# Patient Record
Sex: MALE | Race: WHITE | NOT HISPANIC OR LATINO | URBAN - METROPOLITAN AREA
[De-identification: names, ages, dates, MRNs, and addresses within clinical notes are randomized per-mention and may not be internally consistent; named-entity substitution may affect disease eponyms.]

---

## 2019-12-17 ENCOUNTER — EMERGENCY (EMERGENCY)
Facility: HOSPITAL | Age: 34
LOS: 1 days | Discharge: HOME | End: 2019-12-17
Attending: EMERGENCY MEDICINE | Admitting: INTERNAL MEDICINE
Payer: SELF-PAY

## 2019-12-17 VITALS
WEIGHT: 158.73 LBS | TEMPERATURE: 98 F | RESPIRATION RATE: 16 BRPM | OXYGEN SATURATION: 98 % | DIASTOLIC BLOOD PRESSURE: 84 MMHG | SYSTOLIC BLOOD PRESSURE: 136 MMHG | HEART RATE: 66 BPM | HEIGHT: 72 IN

## 2019-12-17 DIAGNOSIS — K64.4 RESIDUAL HEMORRHOIDAL SKIN TAGS: Chronic | ICD-10-CM

## 2019-12-17 PROCEDURE — 99284 EMERGENCY DEPT VISIT MOD MDM: CPT

## 2019-12-17 PROCEDURE — 99283 EMERGENCY DEPT VISIT LOW MDM: CPT

## 2019-12-17 RX ORDER — LIDOCAINE/HYDROCORTISONE AC 3 %-0.5 %
1 CREAM WITH APPLICATOR RECTAL
Qty: 1 | Refills: 0
Start: 2019-12-17 | End: 2019-12-26

## 2019-12-17 RX ORDER — IBUPROFEN 200 MG
1 TABLET ORAL
Qty: 12 | Refills: 0
Start: 2019-12-17 | End: 2019-12-19

## 2019-12-17 RX ORDER — DOCUSATE SODIUM 100 MG
1 CAPSULE ORAL
Qty: 14 | Refills: 0
Start: 2019-12-17 | End: 2019-12-23

## 2019-12-17 NOTE — ED PROVIDER NOTE - OBJECTIVE STATEMENT
34 yr old male with hx of hemorrhoid sx ( 8 years ago) presents with rectal pain x 1 week, and blood noted with BM x 3 days. Pt denies any fever, chills, abdominal pain, n/v/d, or any other symptoms.

## 2019-12-17 NOTE — ED PROVIDER NOTE - ATTENDING CONTRIBUTION TO CARE
pt c/o anal pain for about 1 week with small amt blood with BMs last 3 days. no abd pain. no fever. no dizziness, sob. has been straining c BMs.    exam:   General: well appearing, NAD.   cor: RRR, s1s2, 2+rad pulses.   lungs: ctabl, no resp distress.   abd: soft, ntnd. anus: L side external hemorrhoid at 3 oclock, 10x6mm, not thrombosed, +tender. no bleeding  neuro: a&ox3, cn2-12 intact, MARTINEZ, 5/5 strength c nl sensation all extremities, nl coordination.   MSK: no extremity swelling.  Skin: normal, no rash    AP: anal pain/bleeding. c/w external hemorrhoid.  treat c lidocaine/HC topical, motrin, stool softeners. GI/surg fu

## 2019-12-17 NOTE — ED ADULT NURSE NOTE - OBJECTIVE STATEMENT
Pt presents to the ER complaining of having rectal pain for about two weeks. Pt stated that he had a fistula in the past and that it was removed. Pt pain level 10/10. Pt denies fever, vomiting and diarrhea.

## 2019-12-17 NOTE — ED PROVIDER NOTE - CLINICAL SUMMARY MEDICAL DECISION MAKING FREE TEXT BOX
34 yr old male with hx of hemorrhoid sx ( 8 years ago) presents with rectal pain x 1 week, and blood noted with BM x 3 days. Pt denies any fever, chills, abdominal pain, n/v/d, or any other symptoms.   external hemorrhoid noted, no signs of thrombosed, pt well appearing   will discharge with colace, motrin, and lido/ hydrocortisone, stable for dc and follow up with GI or surgery

## 2019-12-17 NOTE — ED PROVIDER NOTE - CARE PROVIDERS DIRECT ADDRESSES
[email or web address]

## 2019-12-17 NOTE — ED PROVIDER NOTE - PATIENT PORTAL LINK FT
You can access the FollowMyHealth Patient Portal offered by [facility] by registering at the following website: [email or web address]. By joining [facility]’s FollowMyHealth portal, you will also be able to view your health information using other applications (apps) compatible with our system.

## 2019-12-17 NOTE — ED PROVIDER NOTE - NSFOLLOWUPINSTRUCTIONS_ED_ALL_ED_FT
Drink plenty of fluids   High fiber diet   Take motrin 600mg every 6 hours as needed for pain.   Take colace prescribed and use ointment as prescribed   Follow up with surgery of GI doctor.     Hemorrhoids    WHAT YOU NEED TO KNOW:    Hemorrhoids are swollen blood vessels inside your rectum (internal hemorrhoids) or on your anus (external hemorrhoids). Sometimes a hemorrhoid may prolapse. This means it extends out of your anus.    DISCHARGE INSTRUCTIONS:    Return to the emergency department if:     You have severe pain in your rectum or around your anus.      You have severe pain in your abdomen and you are vomiting.       You have bleeding from your anus that soaks through your underwear.     Contact your healthcare provider if:     You have frequent and painful bowel movements.      Your hemorrhoid looks or feels more swollen than usual.       You do not have a bowel movement for 2 days or more.       You see or feel tissue coming through your anus.       You have questions or concerns about your condition or care.    Medicines: You may need any of the following:     A pad, cream, or ointment can help decrease pain, swelling, and itching.       Stool softeners help treat or prevent constipation.       NSAIDs, such as ibuprofen, help decrease swelling, pain, and fever. NSAIDs can cause stomach bleeding or kidney problems in certain people. If you take blood thinner medicine, always ask your healthcare provider if NSAIDs are safe for you. Always read the medicine label and follow directions.      Take your medicine as directed. Contact your healthcare provider if you think your medicine is not helping or if you have side effects. Tell him or her if you are allergic to any medicine. Keep a list of the medicines, vitamins, and herbs you take. Include the amounts, and when and why you take them. Bring the list or the pill bottles to follow-up visits. Carry your medicine list with you in case of an emergency.    Manage your symptoms:     Apply ice on your anus for 15 to 20 minutes every hour or as directed. Use an ice pack, or put crushed ice in a plastic bag. Cover it with a towel before you apply it to your anus. Ice helps prevent tissue damage and decreases swelling and pain.      Take a sitz bath. Fill a bathtub with 4 to 6 inches of warm water. You may also use a sitz bath pan that fits inside a toilet bowl. Sit in the sitz bath for 15 minutes. Do this 3 times a day, and after each bowel movement. The warm water can help decrease pain and swelling.       Keep your anal area clean. Gently wash the area with warm water daily. Soap may irritate the area. After a bowel movement, wipe with moist towelettes or wet toilet paper. Dry toilet paper can irritate the area.     Prevent hemorrhoids:     Do not strain to have a bowel movement. Do not sit on the toilet too long. These actions can increase pressure on the tissues in your rectum and anus.       Drink plenty of liquids. Liquids can help prevent constipation. Ask how much liquid to drink each day and which liquids are best for you.       Eat a variety of high-fiber foods. Examples include fruits, vegetables, and whole grains. Ask your healthcare provider how much fiber you need each day. You may need to take a fiber supplement.            Exercise as directed. Exercise, such as walking, may make it easier to have a bowel movement. Ask your healthcare provider to help you create an exercise plan.       Do not have anal sex. Anal sex can weaken the skin around your rectum and anus.       Avoid heavy lifting. This can cause straining and increase your risk for another hemorrhoid.     Follow up with your healthcare provider as directed: Write down your questions so you remember to ask them during your visits.

## 2019-12-17 NOTE — ED PROVIDER NOTE - CARE PROVIDER_API CALL
Mansoor Nieves)  ColonRectal Surgery; Surgery  [address], [address]  [place], [place] [id number]  Phone: [phone]  Fax: [phone]  Follow Up Time:     Patrick Villatoro)  Gastroenterology; Internal Medicine  [address] [place] [address]  Phone: [phone]  Fax: [phone]  Follow Up Time:

## 2019-12-20 ENCOUNTER — APPOINTMENT (OUTPATIENT)
Dept: FAMILY MEDICINE | Facility: HOSPITAL | Age: 34
End: 2019-12-20

## 2019-12-20 ENCOUNTER — OUTPATIENT (OUTPATIENT)
Dept: OUTPATIENT SERVICES | Facility: HOSPITAL | Age: 34
LOS: 1 days | End: 2019-12-20
Payer: SELF-PAY

## 2019-12-20 VITALS
SYSTOLIC BLOOD PRESSURE: 123 MMHG | DIASTOLIC BLOOD PRESSURE: 83 MMHG | RESPIRATION RATE: 14 BRPM | WEIGHT: 185 LBS | HEIGHT: 72.75 IN | TEMPERATURE: 98 F | OXYGEN SATURATION: 97 % | HEART RATE: 71 BPM | BODY MASS INDEX: 24.52 KG/M2

## 2019-12-20 DIAGNOSIS — Z80.6 FAMILY HISTORY OF LEUKEMIA: ICD-10-CM

## 2019-12-20 DIAGNOSIS — K64.4 RESIDUAL HEMORRHOIDAL SKIN TAGS: Chronic | ICD-10-CM

## 2019-12-20 DIAGNOSIS — Z00.00 ENCOUNTER FOR GENERAL ADULT MEDICAL EXAMINATION WITHOUT ABNORMAL FINDINGS: ICD-10-CM

## 2019-12-20 PROCEDURE — G0463: CPT

## 2019-12-20 NOTE — PHYSICAL EXAM
[No Acute Distress] : no acute distress [Well Developed] : well developed [Well Nourished] : well nourished [No Lymphadenopathy] : no lymphadenopathy [Supple] : supple [Thyroid Normal, No Nodules] : the thyroid was normal and there were no nodules present [No Respiratory Distress] : no respiratory distress  [No Accessory Muscle Use] : no accessory muscle use [Clear to Auscultation] : lungs were clear to auscultation bilaterally [Normal Rate] : normal rate  [Regular Rhythm] : with a regular rhythm [Normal S1, S2] : normal S1 and S2 [No Murmur] : no murmur heard [No Extremity Clubbing/Cyanosis] : no extremity clubbing/cyanosis [No Edema] : there was no peripheral edema [Soft] : abdomen soft [Non-distended] : non-distended [No Masses] : no abdominal mass palpated [Normal Supraclavicular Nodes] : no supraclavicular lymphadenopathy [Normal Affect] : the affect was normal [Normal Anterior Cervical Nodes] : no anterior cervical lymphadenopathy [Normal Insight/Judgement] : insight and judgment were intact [Normal Sphincter Tone] : normal sphincter tone [de-identified] : Uncomfortable appearing [de-identified] : Mild lwoer abdominal discomfort w/ no guarding/rigidity/rebound [FreeTextEntry1] : L side external hemorrhoid, mildly tender on palpation, no erythema/edema/fluctuance appreciated. + internal hemorrhoids.

## 2019-12-20 NOTE — HISTORY OF PRESENT ILLNESS
[FreeTextEntry1] : New pt - CPE [de-identified] : 34M comes in to establish care and for ED follow up. Pt went 12/17/19 Tuesday to ED for severe anal pain for which he was told he has fistula and external hemorrhoid for which he was prescribed ibuprofen. Reports anal pain started ~1 week ago, has been getting worse since then. He had food poisoning a few weeks ago and was having diarrhea for a few weeks afterwards, which he thinks started this episode. He has been having 3 days of blood in stool, small amount, seen in toilet bowl. Pain is located both inside and outside the anal area. He is afraid to have BM today bc of severe pain. + abdominal discomfort. [place] for which he had "small surgery". Denies fever, chills, nausea/vomiting, abdominal pain, weakness, fatigue, SOB, etc.\par \par Pt is from [facility].

## 2019-12-20 NOTE — ASSESSMENT
[FreeTextEntry1] : 34M w/ PMH as above comes in to establish care.\par \par 1. Health maintenance\par Will address blood work and flu vaccine at next visit when patient not in so much pain\par \par 2. Anal pain/external hemorrhoid\par No evidence of thrombosed external hemorrhoid at this time.\par Continue colace/high fiber diet\par Sent preparation H cream to pharmacy & recommended he use preparation H wipes as well\par Sent 2 days of tramadol-tylenol tabs BID PRN severe pain, may continue ibuprofen as well\par Sitz baths\par Surgery referral provided in case he needs hemorrhoidectomy in future\par Advised pt if he develops fever/chills, worsening/severe anal/abdominal pain, N/V to proceed to ED.\par \par RTC in 2 weeks for f/u

## 2019-12-22 DIAGNOSIS — K62.89 OTHER SPECIFIED DISEASES OF ANUS AND RECTUM: ICD-10-CM

## 2019-12-24 DIAGNOSIS — K62.89 OTHER SPECIFIED DISEASES OF ANUS AND RECTUM: ICD-10-CM

## 2019-12-24 DIAGNOSIS — K64.4 RESIDUAL HEMORRHOIDAL SKIN TAGS: ICD-10-CM

## 2020-01-04 ENCOUNTER — TRANSCRIPTION ENCOUNTER (OUTPATIENT)
Age: 35
End: 2020-01-04

## 2020-01-04 ENCOUNTER — MED ADMIN CHARGE (OUTPATIENT)
Age: 35
End: 2020-01-04

## 2020-01-04 ENCOUNTER — APPOINTMENT (OUTPATIENT)
Dept: FAMILY MEDICINE | Facility: HOSPITAL | Age: 35
End: 2020-01-04

## 2020-01-04 ENCOUNTER — OUTPATIENT (OUTPATIENT)
Dept: OUTPATIENT SERVICES | Facility: HOSPITAL | Age: 35
LOS: 1 days | End: 2020-01-04
Payer: SELF-PAY

## 2020-01-04 VITALS
HEART RATE: 88 BPM | TEMPERATURE: 99.5 F | RESPIRATION RATE: 16 BRPM | OXYGEN SATURATION: 97 % | DIASTOLIC BLOOD PRESSURE: 85 MMHG | WEIGHT: 187 LBS | SYSTOLIC BLOOD PRESSURE: 138 MMHG | BODY MASS INDEX: 24.84 KG/M2

## 2020-01-04 DIAGNOSIS — Z00.00 ENCOUNTER FOR GENERAL ADULT MEDICAL EXAMINATION WITHOUT ABNORMAL FINDINGS: ICD-10-CM

## 2020-01-04 DIAGNOSIS — K64.4 RESIDUAL HEMORRHOIDAL SKIN TAGS: Chronic | ICD-10-CM

## 2020-01-04 PROBLEM — Z78.9 OTHER SPECIFIED HEALTH STATUS: Chronic | Status: ACTIVE | Noted: 2019-12-17

## 2020-01-04 PROCEDURE — G0463: CPT

## 2020-01-04 NOTE — PHYSICAL EXAM
[Normal Oropharynx] : the oropharynx was normal [No Lymphadenopathy] : no lymphadenopathy [Supple] : supple [Normal] : no posterior cervical lymphadenopathy and no anterior cervical lymphadenopathy

## 2020-01-05 NOTE — REVIEW OF SYSTEMS
[Fatigue] : fatigue [Nasal Discharge] : nasal discharge [Sore Throat] : sore throat [Cough] : cough [Chills] : no chills [Shortness Of Breath] : no shortness of breath [Fever] : no fever [Constipation] : no constipation [Diarrhea] : no diarrhea [Abdominal Pain] : no abdominal pain

## 2020-01-05 NOTE — ASSESSMENT
[FreeTextEntry1] : 34M presents for f/u external hemorrhoid, also with viral URI\par -Anal pain resolved\par -Constipation resolved\par -Encouraged patient to continue high fiber and water intake\par -Centor = 0. Provided reassurance that URI is likely viral\par -Flu vaccine administered\par -RTC for health maintenance

## 2020-01-05 NOTE — HISTORY OF PRESENT ILLNESS
[de-identified] : 34M presents for f/u external hemorrhoid. Also with URI\par -Pain and discomfort have resolved\par -No longer requiring preparation H wipes\par -Increased fiber intake. Has stopped coffee, drinking more water\par -Now having soft bowel movements 1-2 times a day\par \par #URI\par -Sick contact baby daughter\par -Symptoms present <1 week. Reports rhinorrhea, cough productive of yellow sputum, sore throat, and initial ocular pruritus that has resolved\par -Symptoms improving

## 2020-01-06 DIAGNOSIS — K62.89 OTHER SPECIFIED DISEASES OF ANUS AND RECTUM: ICD-10-CM

## 2020-01-06 DIAGNOSIS — K64.4 RESIDUAL HEMORRHOIDAL SKIN TAGS: ICD-10-CM

## 2020-02-01 ENCOUNTER — OUTPATIENT (OUTPATIENT)
Dept: OUTPATIENT SERVICES | Facility: HOSPITAL | Age: 35
LOS: 1 days | End: 2020-02-01
Payer: SELF-PAY

## 2020-02-01 ENCOUNTER — APPOINTMENT (OUTPATIENT)
Dept: FAMILY MEDICINE | Facility: HOSPITAL | Age: 35
End: 2020-02-01

## 2020-02-01 VITALS
BODY MASS INDEX: 24.18 KG/M2 | SYSTOLIC BLOOD PRESSURE: 117 MMHG | WEIGHT: 182 LBS | DIASTOLIC BLOOD PRESSURE: 74 MMHG | OXYGEN SATURATION: 95 % | HEART RATE: 72 BPM | RESPIRATION RATE: 16 BRPM | TEMPERATURE: 97.5 F

## 2020-02-01 DIAGNOSIS — K64.4 RESIDUAL HEMORRHOIDAL SKIN TAGS: ICD-10-CM

## 2020-02-01 DIAGNOSIS — B97.89 ACUTE UPPER RESPIRATORY INFECTION, UNSPECIFIED: ICD-10-CM

## 2020-02-01 DIAGNOSIS — Z00.00 ENCOUNTER FOR GENERAL ADULT MEDICAL EXAMINATION W/OUT ABNORMAL FINDINGS: ICD-10-CM

## 2020-02-01 DIAGNOSIS — K62.89 OTHER SPECIFIED DISEASES OF ANUS AND RECTUM: ICD-10-CM

## 2020-02-01 DIAGNOSIS — J06.9 ACUTE UPPER RESPIRATORY INFECTION, UNSPECIFIED: ICD-10-CM

## 2020-02-01 DIAGNOSIS — K64.4 RESIDUAL HEMORRHOIDAL SKIN TAGS: Chronic | ICD-10-CM

## 2020-02-01 DIAGNOSIS — Z00.00 ENCOUNTER FOR GENERAL ADULT MEDICAL EXAMINATION WITHOUT ABNORMAL FINDINGS: ICD-10-CM

## 2020-02-01 PROCEDURE — 90471 IMMUNIZATION ADMIN: CPT

## 2020-02-01 PROCEDURE — G0463: CPT

## 2020-02-01 PROCEDURE — 83036 HEMOGLOBIN GLYCOSYLATED A1C: CPT

## 2020-02-01 PROCEDURE — 80053 COMPREHEN METABOLIC PANEL: CPT

## 2020-02-01 PROCEDURE — 80061 LIPID PANEL: CPT

## 2020-02-01 NOTE — HEALTH RISK ASSESSMENT
[Good] : ~his/her~  mood as  good [] : No [No] : No [No falls in past year] : Patient reported no falls in the past year [0] : 2) Feeling down, depressed, or hopeless: Not at all (0) [[id number]] : 0

## 2020-02-01 NOTE — PHYSICAL EXAM
[No Acute Distress] : no acute distress [Well Nourished] : well nourished [Well Developed] : well developed [Well-Appearing] : well-appearing [EOMI] : extraocular movements intact [PERRL] : pupils equal round and reactive to light [Normal Outer Ear/Nose] : the outer ears and nose were normal in appearance [Fundoscopic Exam Performed] : fundoscopic ~T exam ~C was performed [Normal Oropharynx] : the oropharynx was normal [Thyroid Normal, No Nodules] : the thyroid was normal and there were no nodules present [No Respiratory Distress] : no respiratory distress  [Supple] : supple [Normal Rate] : normal rate  [No Accessory Muscle Use] : no accessory muscle use [Clear to Auscultation] : lungs were clear to auscultation bilaterally [Regular Rhythm] : with a regular rhythm [Normal S1, S2] : normal S1 and S2 [Soft] : abdomen soft [Pedal Pulses Present] : the pedal pulses are present [No Edema] : there was no peripheral edema [Non-distended] : non-distended [Non Tender] : non-tender [No Masses] : no abdominal mass palpated [Normal Bowel Sounds] : normal bowel sounds [Normal Posterior Cervical Nodes] : no posterior cervical lymphadenopathy [Normal Anterior Cervical Nodes] : no anterior cervical lymphadenopathy [No CVA Tenderness] : no CVA  tenderness [Grossly Normal Strength/Tone] : grossly normal strength/tone [No Spinal Tenderness] : no spinal tenderness [Coordination Grossly Intact] : coordination grossly intact [Normal Affect] : the affect was normal [No Focal Deficits] : no focal deficits [Normal Gait] : normal gait [Normal Insight/Judgement] : insight and judgment were intact [de-identified] : no rashes noted on exposed skin

## 2020-02-01 NOTE — ASSESSMENT
[FreeTextEntry1] : \par #HM\par - patient requesting CBc as he has family history of leukemia (m. grandfather). Will order CMP, Lipid panel and A1C as well

## 2020-02-01 NOTE — HISTORY OF PRESENT ILLNESS
[Spouse] : spouse [Other: _____] : [unfilled] [de-identified] : 34 year old M w/ h/o external hemorrhoids presenting to clinic for complete physical exam. \par Patient last seen in clinic on 1/4/2020 dx with viral URI. States that symptoms have improved.\par \par Now complaining of dry cough. \par States he has as allergy to  pollen- has cough every year, last a few months around when seasons change\par denies post nasal drip\par tried honey, lemon tea

## 2020-02-05 DIAGNOSIS — Z23 ENCOUNTER FOR IMMUNIZATION: ICD-10-CM

## 2020-02-05 DIAGNOSIS — R05 COUGH: ICD-10-CM

## 2020-02-11 LAB
BASOPHILS # BLD AUTO: 0.04 K/UL
BASOPHILS NFR BLD AUTO: 0.6 %
EOSINOPHIL # BLD AUTO: 0.37 K/UL
EOSINOPHIL NFR BLD AUTO: 5.3 %
HCT VFR BLD CALC: 49.1 %
HGB BLD-MCNC: 15.5 G/DL
IMM GRANULOCYTES NFR BLD AUTO: 0.3 %
LYMPHOCYTES # BLD AUTO: 2.35 K/UL
LYMPHOCYTES NFR BLD AUTO: 33.4 %
MAN DIFF?: NORMAL
MCHC RBC-ENTMCNC: 28.8 PG
MCHC RBC-ENTMCNC: 31.6 GM/DL
MCV RBC AUTO: 91.3 FL
MONOCYTES # BLD AUTO: 0.51 K/UL
MONOCYTES NFR BLD AUTO: 7.2 %
NEUTROPHILS # BLD AUTO: 3.75 K/UL
NEUTROPHILS NFR BLD AUTO: 53.2 %
PLATELET # BLD AUTO: 181 K/UL
RBC # BLD: 5.38 M/UL
RBC # FLD: 12.8 %
WBC # FLD AUTO: 7.04 K/UL

## 2020-02-20 LAB
ALBUMIN SERPL ELPH-MCNC: 4.3 G/DL
ALP BLD-CCNC: 72 U/L
ALT SERPL-CCNC: 10 U/L
ANION GAP SERPL CALC-SCNC: 13 MMOL/L
AST SERPL-CCNC: 12 U/L
BILIRUB SERPL-MCNC: 0.5 MG/DL
BUN SERPL-MCNC: 22 MG/DL
CALCIUM SERPL-MCNC: 9.4 MG/DL
CHLORIDE SERPL-SCNC: 103 MMOL/L
CHOLEST SERPL-MCNC: 148 MG/DL
CHOLEST/HDLC SERPL: 3.5 RATIO
CO2 SERPL-SCNC: 26 MMOL/L
CREAT SERPL-MCNC: 0.93 MG/DL
ESTIMATED AVERAGE GLUCOSE: 94 MG/DL
GLUCOSE SERPL-MCNC: 70 MG/DL
HBA1C MFR BLD HPLC: 4.9 %
HDLC SERPL-MCNC: 43 MG/DL
LDLC SERPL CALC-MCNC: 91 MG/DL
POTASSIUM SERPL-SCNC: 4.9 MMOL/L
PROT SERPL-MCNC: 6.9 G/DL
SODIUM SERPL-SCNC: 142 MMOL/L
TRIGL SERPL-MCNC: 68 MG/DL

## 2020-11-13 ENCOUNTER — APPOINTMENT (OUTPATIENT)
Dept: FAMILY MEDICINE | Facility: HOSPITAL | Age: 35
End: 2020-11-13

## 2020-11-13 ENCOUNTER — MED ADMIN CHARGE (OUTPATIENT)
Age: 35
End: 2020-11-13

## 2020-11-13 ENCOUNTER — OUTPATIENT (OUTPATIENT)
Dept: OUTPATIENT SERVICES | Facility: HOSPITAL | Age: 35
LOS: 1 days | End: 2020-11-13
Payer: SELF-PAY

## 2020-11-13 VITALS
RESPIRATION RATE: 14 BRPM | WEIGHT: 178 LBS | HEART RATE: 57 BPM | DIASTOLIC BLOOD PRESSURE: 76 MMHG | BODY MASS INDEX: 23.64 KG/M2 | SYSTOLIC BLOOD PRESSURE: 114 MMHG | TEMPERATURE: 97.8 F | OXYGEN SATURATION: 99 %

## 2020-11-13 DIAGNOSIS — Z00.00 ENCOUNTER FOR GENERAL ADULT MEDICAL EXAMINATION WITHOUT ABNORMAL FINDINGS: ICD-10-CM

## 2020-11-13 DIAGNOSIS — K64.4 RESIDUAL HEMORRHOIDAL SKIN TAGS: Chronic | ICD-10-CM

## 2020-11-13 DIAGNOSIS — Z23 ENCOUNTER FOR IMMUNIZATION: ICD-10-CM

## 2020-11-13 DIAGNOSIS — R05 COUGH: ICD-10-CM

## 2020-11-13 DIAGNOSIS — T15.92XA FOREIGN BODY ON EXTERNAL EYE, PART UNSPECIFIED, LEFT EYE, INITIAL ENCOUNTER: ICD-10-CM

## 2020-11-13 PROCEDURE — G0463: CPT

## 2020-11-13 RX ORDER — GLYCERIN, LIDOCAINE 144; 50 MG/G; MG/G
5-14.4 CREAM TOPICAL
Qty: 1 | Refills: 0 | Status: COMPLETED | COMMUNITY
Start: 2019-12-20 | End: 2020-11-13

## 2020-11-13 RX ORDER — CETIRIZINE HYDROCHLORIDE 10 MG/1
10 CAPSULE, LIQUID FILLED ORAL
Qty: 30 | Refills: 0 | Status: COMPLETED | COMMUNITY
Start: 2020-02-01 | End: 2020-11-13

## 2020-11-13 RX ORDER — SULFACETAMIDE SODIUM 100 MG/ML
10 SOLUTION OPHTHALMIC
Qty: 1 | Refills: 0 | Status: ACTIVE | COMMUNITY
Start: 2020-11-13 | End: 1900-01-01

## 2020-11-15 NOTE — HISTORY OF PRESENT ILLNESS
[FreeTextEntry8] : 34 y/o male with no significant PMHx presents with c/o pain and swelling to L eye. Patient states that he was sanding a wall yesterday when some paint chips entered his eye. He immediately flushed his eye with water. Patient states that he continues with the sensation of  something in eye. Denies visual changes, lacrimation, crusting to eyelids. Patient states that symptoms have remained stable, not worsening. He has not used any analgesics or eye drops. \par

## 2020-11-15 NOTE — PHYSICAL EXAM
[No Acute Distress] : no acute distress [PERRL] : pupils equal round and reactive to light [EOMI] : extraocular movements intact [No Respiratory Distress] : no respiratory distress  [Clear to Auscultation] : lungs were clear to auscultation bilaterally [Normal Rate] : normal rate  [Regular Rhythm] : with a regular rhythm [Normal S1, S2] : normal S1 and S2 [No Murmur] : no murmur heard [Normal Gait] : normal gait [Alert and Oriented x3] : oriented to person, place, and time [de-identified] : L eye: conjunctiva erythematous, no apparent corneal damage, eyelids edematous, abrasion to inner upper eyelid, Visual acuity: L: 20/20, R 20/40, Both 20/20

## 2020-11-15 NOTE — REVIEW OF SYSTEMS
[Pain] : pain [Redness] : redness [Negative] : Heme/Lymph [Discharge] : no discharge [Dryness] : no dryness [Vision Problems] : no vision problems [Itching] : no itching [FreeTextEntry3] : foreign body sensation to eye, pain and swelling to eyelids of L  eye

## 2020-11-16 ENCOUNTER — EMERGENCY (EMERGENCY)
Facility: HOSPITAL | Age: 35
LOS: 1 days | Discharge: HOME | End: 2020-11-16
Admitting: EMERGENCY MEDICINE
Payer: MEDICAID

## 2020-11-16 ENCOUNTER — APPOINTMENT (OUTPATIENT)
Dept: OPHTHALMOLOGY | Facility: CLINIC | Age: 35
End: 2020-11-16

## 2020-11-16 VITALS
RESPIRATION RATE: 18 BRPM | SYSTOLIC BLOOD PRESSURE: 121 MMHG | HEART RATE: 61 BPM | OXYGEN SATURATION: 100 % | TEMPERATURE: 98 F | DIASTOLIC BLOOD PRESSURE: 79 MMHG

## 2020-11-16 DIAGNOSIS — T15.92XA FOREIGN BODY ON EXTERNAL EYE, PART UNSPECIFIED, LEFT EYE, INITIAL ENCOUNTER: ICD-10-CM

## 2020-11-16 PROCEDURE — 99283 EMERGENCY DEPT VISIT LOW MDM: CPT

## 2020-11-16 RX ORDER — OFLOXACIN 0.3 %
1 DROPS OPHTHALMIC (EYE) ONCE
Refills: 0 | Status: COMPLETED | OUTPATIENT
Start: 2020-11-16 | End: 2020-11-16

## 2020-11-16 RX ADMIN — Medication 1 DROP(S): at 04:09

## 2020-11-16 NOTE — ED PROVIDER NOTE - PHYSICAL EXAMINATION
left eye + peerl, + tetracaine placed, + corneal abrasion appreciated on slit-lamp exam, no active discharge

## 2020-11-16 NOTE — ED PROVIDER NOTE - PATIENT PORTAL LINK FT
You can access the FollowMyHealth Patient Portal offered by [facility] by registering at the following website: [email or web address]. By joining [facility]’s FollowMyHealth portal, you will also be able to view your health information using other applications (apps) compatible with our system.

## 2020-11-16 NOTE — ED ADULT TRIAGE NOTE - CHIEF COMPLAINT QUOTE
pt c/o L eye irritation. 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 Pt saw MD on Friday who gave home eye drops but pt states has been having tearing and pain with no relief. Pt noted with redness to L eye with watery discharge, no vision changes minimal edema.

## 2020-11-16 NOTE — ED PROVIDER NOTE - OBJECTIVE STATEMENT
36 y/o male no pmh presents with c/o left eye iriitation x 3-4 days, state sthat he was sanding somewood, + was wearing goggles and samll piece of splinters went into his left eye, had irritation, went to his md 2 days ago who gave him some eye drops, with no relief, painis worsening, feel slike eye is swollen and tearing, cant open his eye, deneis any other trauma to his eye, any headaches, neck pain, cough, f/c/nv/d, chest pain, sob, abdominal pain, urinary symptoms, numbness/weakness/tingling, does not wear contacts 34 y/o male no pmh presents with c/o left eye iriitation x 3-4 days, state sthat he was sanding velvet ewood, + was wearing goggles and samll piece of splinters went into his left eye, had irritation, has been rubbing it  went to his md 2 days ago who gave him some eye drops, which wa ssupposed to cure the irritation, with no relief, painis worsening, feel slike eye is swollen and tearing, cant open his eye, + photophobia, deneis any other trauma to his eye, any headaches, neck pain, cough, f/c/nv/d, chest pain, sob, abdominal pain, urinary symptoms, numbness/weakness/tingling, does not wear contacts 36 y/o male no pmhx presents with c/o left eye irritation x 3-4 days, states that he was sanding some wood, + was wearing goggles and small piece of splinters went into his left eye, had irritation, has been rubbing it  went to his md 2 days ago who gave him some eye drops, which was supposed to cure the irritation, with no relief, pain is worsening, feels like eye is swollen and tearing, can't open his eye, +photophobia, denies any other trauma to his eye, any headaches, neck pain, cough, f/c/nv/d, chest pain, sob, abdominal pain, urinary symptoms, numbness/weakness/tingling, does not wear contacts

## 2020-11-21 ENCOUNTER — APPOINTMENT (OUTPATIENT)
Dept: FAMILY MEDICINE | Facility: HOSPITAL | Age: 35
End: 2020-11-21

## 2020-11-23 ENCOUNTER — APPOINTMENT (OUTPATIENT)
Dept: OPHTHALMOLOGY | Facility: CLINIC | Age: 35
End: 2020-11-23

## 2020-11-30 ENCOUNTER — TRANSCRIPTION ENCOUNTER (OUTPATIENT)
Age: 35
End: 2020-11-30

## 2021-06-19 ENCOUNTER — EMERGENCY (EMERGENCY)
Facility: HOSPITAL | Age: 36
LOS: 1 days | Discharge: HOME | End: 2021-06-19
Attending: EMERGENCY MEDICINE | Admitting: EMERGENCY MEDICINE
Payer: MEDICAID

## 2021-06-19 VITALS
DIASTOLIC BLOOD PRESSURE: 72 MMHG | SYSTOLIC BLOOD PRESSURE: 116 MMHG | HEART RATE: 87 BPM | OXYGEN SATURATION: 98 % | RESPIRATION RATE: 17 BRPM | TEMPERATURE: 99 F

## 2021-06-19 PROCEDURE — 73130 X-RAY EXAM OF HAND: CPT | Mod: 26,RT

## 2021-06-19 PROCEDURE — 99283 EMERGENCY DEPT VISIT LOW MDM: CPT

## 2021-06-19 NOTE — ED PROVIDER NOTE - OBJECTIVE STATEMENT
Attendinyo male presents with sensation of splinter in the right index finger.  pt was caulking a bathtub about 10 days ago and feels like he may have gotten a splinter.  no redness to finger.  no swelling.  has full range of motion of the finger. Attendinyo male presents with sensation of splinter in the right index finger.  pt was caulking a bathtub about 10 days ago and feels like he may have gotten a splinter.  no redness to finger.  no swelling.  has full range of motion of the finger.    LETICIA Schwab: 35yM w/no pmhx presenting with concern for splinter in the right index finger x 2 weeks. Pt ran his finger along the edge of a bathtub while caulking and felt he got a wooden splinter at the time. Pt reports he has been doing warm water soaks and attempted to remove it with tweezers without success. Pt reports pain to the area with flexion of the finger. Denies redness, swelling, pus drainage, numbness, weakness or any other concerns.

## 2021-06-19 NOTE — ED PROVIDER NOTE - PROGRESS NOTE DETAILS
LETICIA Schwab: xray without evidence of foreign body, no palpable foreign body on exam. Will d/c home with hand surgery follow up. Pt is right hand dominant. Tetanus within the last 10 years.

## 2021-06-19 NOTE — ED PROVIDER NOTE - NSFOLLOWUPINSTRUCTIONS_ED_ALL_ED_FT
Follow up with a hand surgeon within 1 week, information provided  Follow up with your primary care doctor within 1 week  Continue warm water soaks  Return to the ER with any worsening or concerning symptoms, increased pain or swelling, numbness, weakness, fever/chills or any other concerns.

## 2021-06-19 NOTE — ED PROVIDER NOTE - CLINICAL SUMMARY MEDICAL DECISION MAKING FREE TEXT BOX
Possible foreign body in finger.  will get xray.  pt will need hand surgery followup as foreign body not visualized here or unable to be removed after 10 days.

## 2021-06-19 NOTE — ED PROVIDER NOTE - CARE PROVIDER_API CALL
Edyta Young)  Plastic Surgery; Surgery  [address], [address]  [place] [address]  Phone: [phone]  Fax: [phone]  Follow Up Time:

## 2022-02-04 NOTE — ED ADULT TRIAGE NOTE - IDEAL BODY WEIGHT(KG)
78 Hydroquinone Pregnancy And Lactation Text: This medication has not been assigned a Pregnancy Risk Category but animal studies failed to show danger with the topical medication. It is unknown if the medication is excreted in breast milk.

## 2023-11-19 ENCOUNTER — INPATIENT (INPATIENT)
Facility: HOSPITAL | Age: 38
LOS: 9 days | Discharge: HOME | End: 2023-11-29
Attending: STUDENT IN AN ORGANIZED HEALTH CARE EDUCATION/TRAINING PROGRAM | Admitting: PSYCHIATRY & NEUROLOGY
Payer: MEDICAID

## 2023-11-19 VITALS
DIASTOLIC BLOOD PRESSURE: 96 MMHG | OXYGEN SATURATION: 100 % | RESPIRATION RATE: 18 BRPM | SYSTOLIC BLOOD PRESSURE: 142 MMHG | TEMPERATURE: 98 F | HEART RATE: 81 BPM
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
## 2023-11-19 LAB
ALBUMIN SERPL ELPH-MCNC: 4.3 G/DL — SIGNIFICANT CHANGE UP (ref 3.3–5)
ALBUMIN SERPL ELPH-MCNC: 4.3 G/DL — SIGNIFICANT CHANGE UP (ref 3.3–5)
ALP SERPL-CCNC: 63 U/L — SIGNIFICANT CHANGE UP (ref 40–120)
ALP SERPL-CCNC: 63 U/L — SIGNIFICANT CHANGE UP (ref 40–120)
ALT FLD-CCNC: 6 U/L — SIGNIFICANT CHANGE UP (ref 4–41)
ALT FLD-CCNC: 6 U/L — SIGNIFICANT CHANGE UP (ref 4–41)
ANION GAP SERPL CALC-SCNC: 13 MMOL/L — SIGNIFICANT CHANGE UP (ref 7–14)
ANION GAP SERPL CALC-SCNC: 13 MMOL/L — SIGNIFICANT CHANGE UP (ref 7–14)
APAP SERPL-MCNC: <10 UG/ML — LOW (ref 15–25)
APAP SERPL-MCNC: <10 UG/ML — LOW (ref 15–25)
AST SERPL-CCNC: 15 U/L — SIGNIFICANT CHANGE UP (ref 4–40)
AST SERPL-CCNC: 15 U/L — SIGNIFICANT CHANGE UP (ref 4–40)
B PERT DNA SPEC QL NAA+PROBE: SIGNIFICANT CHANGE UP
B PERT DNA SPEC QL NAA+PROBE: SIGNIFICANT CHANGE UP
B PERT+PARAPERT DNA PNL SPEC NAA+PROBE: SIGNIFICANT CHANGE UP
B PERT+PARAPERT DNA PNL SPEC NAA+PROBE: SIGNIFICANT CHANGE UP
BASOPHILS # BLD AUTO: 0.04 K/UL — SIGNIFICANT CHANGE UP (ref 0–0.2)
BASOPHILS # BLD AUTO: 0.04 K/UL — SIGNIFICANT CHANGE UP (ref 0–0.2)
BASOPHILS NFR BLD AUTO: 0.5 % — SIGNIFICANT CHANGE UP (ref 0–2)
BASOPHILS NFR BLD AUTO: 0.5 % — SIGNIFICANT CHANGE UP (ref 0–2)
BILIRUB SERPL-MCNC: 0.9 MG/DL — SIGNIFICANT CHANGE UP (ref 0.2–1.2)
BILIRUB SERPL-MCNC: 0.9 MG/DL — SIGNIFICANT CHANGE UP (ref 0.2–1.2)
BORDETELLA PARAPERTUSSIS (RAPRVP): SIGNIFICANT CHANGE UP
BORDETELLA PARAPERTUSSIS (RAPRVP): SIGNIFICANT CHANGE UP
BUN SERPL-MCNC: 22 MG/DL — SIGNIFICANT CHANGE UP (ref 7–23)
BUN SERPL-MCNC: 22 MG/DL — SIGNIFICANT CHANGE UP (ref 7–23)
C PNEUM DNA SPEC QL NAA+PROBE: SIGNIFICANT CHANGE UP
C PNEUM DNA SPEC QL NAA+PROBE: SIGNIFICANT CHANGE UP
CALCIUM SERPL-MCNC: 8.9 MG/DL — SIGNIFICANT CHANGE UP (ref 8.4–10.5)
CALCIUM SERPL-MCNC: 8.9 MG/DL — SIGNIFICANT CHANGE UP (ref 8.4–10.5)
CHLORIDE SERPL-SCNC: 104 MMOL/L — SIGNIFICANT CHANGE UP (ref 98–107)
CHLORIDE SERPL-SCNC: 104 MMOL/L — SIGNIFICANT CHANGE UP (ref 98–107)
CO2 SERPL-SCNC: 22 MMOL/L — SIGNIFICANT CHANGE UP (ref 22–31)
CO2 SERPL-SCNC: 22 MMOL/L — SIGNIFICANT CHANGE UP (ref 22–31)
CREAT SERPL-MCNC: 0.81 MG/DL — SIGNIFICANT CHANGE UP (ref 0.5–1.3)
CREAT SERPL-MCNC: 0.81 MG/DL — SIGNIFICANT CHANGE UP (ref 0.5–1.3)
EGFR: 115 ML/MIN/1.73M2 — SIGNIFICANT CHANGE UP
EGFR: 115 ML/MIN/1.73M2 — SIGNIFICANT CHANGE UP
EOSINOPHIL # BLD AUTO: 0.18 K/UL — SIGNIFICANT CHANGE UP (ref 0–0.5)
EOSINOPHIL # BLD AUTO: 0.18 K/UL — SIGNIFICANT CHANGE UP (ref 0–0.5)
EOSINOPHIL NFR BLD AUTO: 2.1 % — SIGNIFICANT CHANGE UP (ref 0–6)
EOSINOPHIL NFR BLD AUTO: 2.1 % — SIGNIFICANT CHANGE UP (ref 0–6)
ETHANOL SERPL-MCNC: <10 MG/DL — SIGNIFICANT CHANGE UP
ETHANOL SERPL-MCNC: <10 MG/DL — SIGNIFICANT CHANGE UP
FLUAV SUBTYP SPEC NAA+PROBE: SIGNIFICANT CHANGE UP
FLUAV SUBTYP SPEC NAA+PROBE: SIGNIFICANT CHANGE UP
FLUBV RNA SPEC QL NAA+PROBE: SIGNIFICANT CHANGE UP
FLUBV RNA SPEC QL NAA+PROBE: SIGNIFICANT CHANGE UP
GLUCOSE SERPL-MCNC: 88 MG/DL — SIGNIFICANT CHANGE UP (ref 70–99)
GLUCOSE SERPL-MCNC: 88 MG/DL — SIGNIFICANT CHANGE UP (ref 70–99)
HADV DNA SPEC QL NAA+PROBE: SIGNIFICANT CHANGE UP
HADV DNA SPEC QL NAA+PROBE: SIGNIFICANT CHANGE UP
HCOV 229E RNA SPEC QL NAA+PROBE: SIGNIFICANT CHANGE UP
HCOV 229E RNA SPEC QL NAA+PROBE: SIGNIFICANT CHANGE UP
HCOV HKU1 RNA SPEC QL NAA+PROBE: SIGNIFICANT CHANGE UP
HCOV HKU1 RNA SPEC QL NAA+PROBE: SIGNIFICANT CHANGE UP
HCOV NL63 RNA SPEC QL NAA+PROBE: SIGNIFICANT CHANGE UP
HCOV NL63 RNA SPEC QL NAA+PROBE: SIGNIFICANT CHANGE UP
HCOV OC43 RNA SPEC QL NAA+PROBE: SIGNIFICANT CHANGE UP
HCOV OC43 RNA SPEC QL NAA+PROBE: SIGNIFICANT CHANGE UP
HCT VFR BLD CALC: 47.5 % — SIGNIFICANT CHANGE UP (ref 39–50)
HCT VFR BLD CALC: 47.5 % — SIGNIFICANT CHANGE UP (ref 39–50)
HGB BLD-MCNC: 15.5 G/DL — SIGNIFICANT CHANGE UP (ref 13–17)
HGB BLD-MCNC: 15.5 G/DL — SIGNIFICANT CHANGE UP (ref 13–17)
HMPV RNA SPEC QL NAA+PROBE: SIGNIFICANT CHANGE UP
HMPV RNA SPEC QL NAA+PROBE: SIGNIFICANT CHANGE UP
HPIV1 RNA SPEC QL NAA+PROBE: SIGNIFICANT CHANGE UP
HPIV1 RNA SPEC QL NAA+PROBE: SIGNIFICANT CHANGE UP
HPIV2 RNA SPEC QL NAA+PROBE: SIGNIFICANT CHANGE UP
HPIV2 RNA SPEC QL NAA+PROBE: SIGNIFICANT CHANGE UP
HPIV3 RNA SPEC QL NAA+PROBE: SIGNIFICANT CHANGE UP
HPIV3 RNA SPEC QL NAA+PROBE: SIGNIFICANT CHANGE UP
HPIV4 RNA SPEC QL NAA+PROBE: SIGNIFICANT CHANGE UP
HPIV4 RNA SPEC QL NAA+PROBE: SIGNIFICANT CHANGE UP
IANC: 6.07 K/UL — SIGNIFICANT CHANGE UP (ref 1.8–7.4)
IANC: 6.07 K/UL — SIGNIFICANT CHANGE UP (ref 1.8–7.4)
IMM GRANULOCYTES NFR BLD AUTO: 0.2 % — SIGNIFICANT CHANGE UP (ref 0–0.9)
IMM GRANULOCYTES NFR BLD AUTO: 0.2 % — SIGNIFICANT CHANGE UP (ref 0–0.9)
LYMPHOCYTES # BLD AUTO: 1.83 K/UL — SIGNIFICANT CHANGE UP (ref 1–3.3)
LYMPHOCYTES # BLD AUTO: 1.83 K/UL — SIGNIFICANT CHANGE UP (ref 1–3.3)
LYMPHOCYTES # BLD AUTO: 21 % — SIGNIFICANT CHANGE UP (ref 13–44)
LYMPHOCYTES # BLD AUTO: 21 % — SIGNIFICANT CHANGE UP (ref 13–44)
M PNEUMO DNA SPEC QL NAA+PROBE: SIGNIFICANT CHANGE UP
M PNEUMO DNA SPEC QL NAA+PROBE: SIGNIFICANT CHANGE UP
MCHC RBC-ENTMCNC: 29.1 PG — SIGNIFICANT CHANGE UP (ref 27–34)
MCHC RBC-ENTMCNC: 29.1 PG — SIGNIFICANT CHANGE UP (ref 27–34)
MCHC RBC-ENTMCNC: 32.6 GM/DL — SIGNIFICANT CHANGE UP (ref 32–36)
MCHC RBC-ENTMCNC: 32.6 GM/DL — SIGNIFICANT CHANGE UP (ref 32–36)
MCV RBC AUTO: 89.1 FL — SIGNIFICANT CHANGE UP (ref 80–100)
MCV RBC AUTO: 89.1 FL — SIGNIFICANT CHANGE UP (ref 80–100)
MONOCYTES # BLD AUTO: 0.56 K/UL — SIGNIFICANT CHANGE UP (ref 0–0.9)
MONOCYTES # BLD AUTO: 0.56 K/UL — SIGNIFICANT CHANGE UP (ref 0–0.9)
MONOCYTES NFR BLD AUTO: 6.4 % — SIGNIFICANT CHANGE UP (ref 2–14)
MONOCYTES NFR BLD AUTO: 6.4 % — SIGNIFICANT CHANGE UP (ref 2–14)
NEUTROPHILS # BLD AUTO: 6.07 K/UL — SIGNIFICANT CHANGE UP (ref 1.8–7.4)
NEUTROPHILS # BLD AUTO: 6.07 K/UL — SIGNIFICANT CHANGE UP (ref 1.8–7.4)
NEUTROPHILS NFR BLD AUTO: 69.8 % — SIGNIFICANT CHANGE UP (ref 43–77)
NEUTROPHILS NFR BLD AUTO: 69.8 % — SIGNIFICANT CHANGE UP (ref 43–77)
NRBC # BLD: 0 /100 WBCS — SIGNIFICANT CHANGE UP (ref 0–0)
NRBC # BLD: 0 /100 WBCS — SIGNIFICANT CHANGE UP (ref 0–0)
NRBC # FLD: 0 K/UL — SIGNIFICANT CHANGE UP (ref 0–0)
NRBC # FLD: 0 K/UL — SIGNIFICANT CHANGE UP (ref 0–0)
PLATELET # BLD AUTO: 176 K/UL — SIGNIFICANT CHANGE UP (ref 150–400)
PLATELET # BLD AUTO: 176 K/UL — SIGNIFICANT CHANGE UP (ref 150–400)
POTASSIUM SERPL-MCNC: 4.3 MMOL/L — SIGNIFICANT CHANGE UP (ref 3.5–5.3)
POTASSIUM SERPL-MCNC: 4.3 MMOL/L — SIGNIFICANT CHANGE UP (ref 3.5–5.3)
POTASSIUM SERPL-SCNC: 4.3 MMOL/L — SIGNIFICANT CHANGE UP (ref 3.5–5.3)
POTASSIUM SERPL-SCNC: 4.3 MMOL/L — SIGNIFICANT CHANGE UP (ref 3.5–5.3)
PROT SERPL-MCNC: 6.9 G/DL — SIGNIFICANT CHANGE UP (ref 6–8.3)
PROT SERPL-MCNC: 6.9 G/DL — SIGNIFICANT CHANGE UP (ref 6–8.3)
RAPID RVP RESULT: DETECTED
RAPID RVP RESULT: DETECTED
RBC # BLD: 5.33 M/UL — SIGNIFICANT CHANGE UP (ref 4.2–5.8)
RBC # BLD: 5.33 M/UL — SIGNIFICANT CHANGE UP (ref 4.2–5.8)
RBC # FLD: 12.6 % — SIGNIFICANT CHANGE UP (ref 10.3–14.5)
RBC # FLD: 12.6 % — SIGNIFICANT CHANGE UP (ref 10.3–14.5)
RSV RNA SPEC QL NAA+PROBE: SIGNIFICANT CHANGE UP
RSV RNA SPEC QL NAA+PROBE: SIGNIFICANT CHANGE UP
RV+EV RNA SPEC QL NAA+PROBE: DETECTED
RV+EV RNA SPEC QL NAA+PROBE: DETECTED
SALICYLATES SERPL-MCNC: <0.3 MG/DL — LOW (ref 15–30)
SALICYLATES SERPL-MCNC: <0.3 MG/DL — LOW (ref 15–30)
SARS-COV-2 RNA SPEC QL NAA+PROBE: SIGNIFICANT CHANGE UP
SARS-COV-2 RNA SPEC QL NAA+PROBE: SIGNIFICANT CHANGE UP
SODIUM SERPL-SCNC: 139 MMOL/L — SIGNIFICANT CHANGE UP (ref 135–145)
SODIUM SERPL-SCNC: 139 MMOL/L — SIGNIFICANT CHANGE UP (ref 135–145)
TOXICOLOGY SCREEN, DRUGS OF ABUSE, SERUM RESULT: SIGNIFICANT CHANGE UP
TOXICOLOGY SCREEN, DRUGS OF ABUSE, SERUM RESULT: SIGNIFICANT CHANGE UP
TSH SERPL-MCNC: 0.89 UIU/ML — SIGNIFICANT CHANGE UP (ref 0.27–4.2)
TSH SERPL-MCNC: 0.89 UIU/ML — SIGNIFICANT CHANGE UP (ref 0.27–4.2)
WBC # BLD: 8.7 K/UL — SIGNIFICANT CHANGE UP (ref 3.8–10.5)
WBC # BLD: 8.7 K/UL — SIGNIFICANT CHANGE UP (ref 3.8–10.5)
WBC # FLD AUTO: 8.7 K/UL — SIGNIFICANT CHANGE UP (ref 3.8–10.5)
WBC # FLD AUTO: 8.7 K/UL — SIGNIFICANT CHANGE UP (ref 3.8–10.5)

## 2023-11-19 PROCEDURE — 99285 EMERGENCY DEPT VISIT HI MDM: CPT

## 2023-11-19 RX ORDER — TETANUS TOXOID, REDUCED DIPHTHERIA TOXOID AND ACELLULAR PERTUSSIS VACCINE, ADSORBED 5; 2.5; 8; 8; 2.5 [IU]/.5ML; [IU]/.5ML; UG/.5ML; UG/.5ML; UG/.5ML
0.5 SUSPENSION INTRAMUSCULAR ONCE
Refills: 0 | Status: COMPLETED | OUTPATIENT
Start: 2023-11-19 | End: 2023-11-19

## 2023-11-19 RX ORDER — SODIUM CHLORIDE 9 MG/ML
1000 INJECTION INTRAMUSCULAR; INTRAVENOUS; SUBCUTANEOUS ONCE
Refills: 0 | Status: COMPLETED | OUTPATIENT
Start: 2023-11-19 | End: 2023-11-19

## 2023-11-19 RX ADMIN — SODIUM CHLORIDE 1000 MILLILITER(S): 9 INJECTION INTRAMUSCULAR; INTRAVENOUS; SUBCUTANEOUS at 08:04

## 2023-11-19 RX ADMIN — TETANUS TOXOID, REDUCED DIPHTHERIA TOXOID AND ACELLULAR PERTUSSIS VACCINE, ADSORBED 0.5 MILLILITER(S): 5; 2.5; 8; 8; 2.5 SUSPENSION INTRAMUSCULAR at 04:11

## 2023-11-19 RX ADMIN — SODIUM CHLORIDE 1000 MILLILITER(S): 9 INJECTION INTRAMUSCULAR; INTRAVENOUS; SUBCUTANEOUS at 06:08
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
## 2023-11-19 NOTE — ED ADULT NURSE REASSESSMENT NOTE - NS ED NURSE REASSESS COMMENT FT1
Controlled substance from patient secured with pharmacy. Medication storage receipt placed in chart.

## 2023-11-19 NOTE — ED PROVIDER NOTE - PROGRESS NOTE DETAILS
DD ED ATTG: re-assumed care this morning.  Sleeping comfortably, in no distress.  No report of events overnight.  Change dressing, apply bacitracin to L arm wound.  Boarding awaiting admission to psych facility.

## 2023-11-19 NOTE — ED PROVIDER NOTE - CARE PLAN
1 Principal Discharge DX:	Suicide threat or attempt   Principal Discharge DX:	Suicide threat or attempt  Secondary Diagnosis:	Multiple puncture wounds  Secondary Diagnosis:	Benzodiazepine overdose

## 2023-11-19 NOTE — CONSULT NOTE ADULT - ASSESSMENT
Assessment:   Low suspicion for clinically significant toxicity. Benzodiazapines as single xenobiotic ingestion are generally well tolerated. Pt not significantly sedate, vitals reassuring, pt is protecting airway. EKG with  (though this is mostly seen in V2 and V3 with other leads < 100ms, no other stigmata seen on EKG of sodium channel blockade).     Recommendations:  1. Follow up labs and co-ingestants.  2. Monitor pt for 6 hours from time of ingestion (until 8am)  3. Repeat EKG near the end of the observation period to ensure intervals (QRS/QTc) remain stable/aren’t worsening.   4. If pt remains clinically stable for observation period and labs/repeat EKG are reassuring, pt can be cleared from toxicologic standpoint

## 2023-11-19 NOTE — ED ADULT NURSE REASSESSMENT NOTE - NS ED NURSE REASSESS COMMENT FT1
Received pt in  from main er pt calm denies si/hi/avh presently, pt awaiting bed assignment safety & comfort measures maintained eval on going.
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
## 2023-11-19 NOTE — ED BEHAVIORAL HEALTH NOTE - BEHAVIORAL HEALTH NOTE
[facility]  No beds until Monday (M & F)    [facility]  No M beds    [facility]  No beds    [facility]   No M beds    [facility]  No avail; their pt's awaiting review for admission, cannot review outside hospitals.    [facility]  Will review Pt     [facility]   No beds. 1 bed for their ED Pts     [facility]  No M beds.    [facility]   No M beds

## 2023-11-19 NOTE — ED BEHAVIORAL HEALTH ASSESSMENT NOTE - HPI (INCLUDE ILLNESS QUALITY, SEVERITY, DURATION, TIMING, CONTEXT, MODIFYING FACTORS, ASSOCIATED SIGNS AND SYMPTOMS)
38 year-old Maced[place] male , lives in [place] with wife and 4 year-old daughter, no previous psychiatric history, no previous suicide attempts, no substance misuse, no legal issues. Patient was brought to the ED via ambulance and police last night after being found by his wife with a self-inflicted wound to his arm and after having consumed about 20 1 mg Sedoxil tablets (mexazolam). Mexazolam is a long-acting benzodiazepine available in [facility] under the brand name Sedoxil. This medication was not prescribed to the patient, but was given to his mother in law who was visiting from [facility] and who left the pills at his home.  Patient provides little history and though awake and alert at the time of exam is rather guarded, offering only one or two word answers to questions, asking to go home. Wife gives the majority of history, reports that she has been discussing divorce with him for some time, and has been  from him in their own home- he sleeps on the couch in the living room. Yesterday evening she told him that she had actually found an  and paid and that the divorce will be happening. He was terribly upset by this news, apparently did not think that divorce would happen. She went into her room and closed the door. When she came out of her room last night to go to the kitchen, she found him on the floor "with blood everywhere, and the empty pill blisters next to him." Her daughter was in her room, and never saw this. She initially called a friend, who came over and then called an ambulance/911. Her  was awake when 911 arrived, was refusing to go to the ED, required police to bring him. Wife reports that before he left for the hospital he told her "I will never forgive you for this," and she found this chilling. She reports that he has never been depressed, but he does have a temper and she was worried about what this statement meant, also worried about him returning home to live with them.   On exam, patient is alert, reports only "I took the pills, I don't think they would kill me." Denies feeling sad or depressed, denies all psychiatric review of systems, does not elaborate at all on circumstances leading up to ingestion, asks only to "go home." He was medically cleared for toxicology, transferred to .

## 2023-11-19 NOTE — CONSULT NOTE ADULT - SUBJECTIVE AND OBJECTIVE BOX
MEDICAL TOXICOLOGY CONSULT    HPI:  38M no signif PMH presents to the ED after intentional ingestion in an attempt at self harm. Pt reports taking 10 tablets of mexazolam (benzodiazepine) at about 2am this morning. Denies co-ingestants. Pt without any acute symptoms upon arrival to the ED.   Vitals: HR 60, /85, RR 18, T 36.4F sat 98% RA  EKG: , QTc 437  Exam: AAOx3, not significantly sedate, pupils 3mm and reactive bilaterally, no diaphoresis/flushed skin/tremors/clonus      Toxicology consulted for ingestion of mexazolam        PAST MEDICAL & SURGICAL HISTORY:  No pertinent past medical history          MEDICATION HISTORY:      FAMILY HISTORY:      REVIEW OF SYSTEMS:   _____unable to perform due to intoxication, dementia, or illness      Vital Signs Last 24 Hrs  T(C): 36.4 (19 Nov 2023 03:20), Max: 36.4 (19 Nov 2023 02:35)  T(F): 97.5 (19 Nov 2023 03:20), Max: 97.6 (19 Nov 2023 02:35)  HR: 60 (19 Nov 2023 03:20) (60 - 81)  BP: 121/85 (19 Nov 2023 03:20) (121/85 - 142/96)  BP(mean): --  RR: 18 (19 Nov 2023 03:20) (18 - 18)  SpO2: 98% (19 Nov 2023 03:20) (98% - 100%)    Parameters below as of 19 Nov 2023 03:20  Patient On (Oxygen Delivery Method): room air        SIGNIFICANT LABORATORY STUDIES:                        15.5   8.70  )-----------( 176      ( 19 Nov 2023 03:18 )             47.5       11-19    139  |  104  |  22  ----------------------------<  88  4.3   |  22  |  0.81    Ca    8.9      19 Nov 2023 03:18    TPro  6.9  /  Alb  4.3  /  TBili  0.9  /  DBili  x   /  AST  15  /  ALT  6   /  AlkPhos  63  11-19          Urinalysis Basic - ( 19 Nov 2023 03:18 )    Color: x / Appearance: x / SG: x / pH: x  Gluc: 88 mg/dL / Ketone: x  / Bili: x / Urobili: x   Blood: x / Protein: x / Nitrite: x   Leuk Esterase: x / RBC: x / WBC x   Sq Epi: x / Non Sq Epi: x / Bacteria: x        Anion Gap: 13 11-19 @ 03:18  CK: -- 11-19 @ 03:18  Troponin:  --  11-19 @ 03:18  Pro-BNP:  --  11-19 @ 03:18  VBG:  --  11-19 @ 03:18  Carboxyhemoglobin %:  --  11-19 @ 03:18  Methemoglobin %:  --  11-19 @ 03:18  Osmolality Serum:  --  11-19 @ 03:18  Aspirin Level: <0.3<L>  11-19 @ 03:18  Acetaminophen Level:  <10<L>  11-19 @ 03:18  Ethanol Level:  --  11-19 @ 03:18  Digoxin Level:  --  11-19 @ 03:18  Phenytoin Level:  --  11-19 @ 03:18  Carbamazepine level:  --  11-19 @ 03:18  Lamotrigine level:  --  11-19 @ 03:18

## 2023-11-19 NOTE — ED ADULT NURSE REASSESSMENT NOTE - NS ED NURSE REASSESS COMMENT FT1
pt belongings tracked and documented on  belonging sheet, given to PES.  IV and CO dc/ed, pt walked to BH

## 2023-11-19 NOTE — ED ADULT NURSE NOTE - OBJECTIVE STATEMENT
Pt received to Rm 24, A&Ox4. Pt arrives via EMS for suicidal attempt. Pt states he took 20 mg, 10 pills,  of his wife's Sedoxil medication at 2 am.  Pt denies previous suicidal attempts. Pt also admits to self harm, states he "cut himself with a knife", superficial puncture wound noted to left forearm, bleeding controlled wrapped with gauze. Pt states he has felt depressed, states that his "wife recently told him she wants a divorce". Pt denies HI, auditory or visual hallucinations. Pt arrives with 20G IV to right AC placed by EMS. 20G IV established to left hand, labs drawn and sent. Belongings collected, placed in closet by CT scan. valuables placed with security. Constant observation in place, PCA at bedside for 1:1. Safety maintained throughout. Plan of care ongoing.

## 2023-11-19 NOTE — ED BEHAVIORAL HEALTH ASSESSMENT NOTE - NSACTIVEVENT_PSY_ALL_CORE
STM Recheck:  Patient has minimal CPAP use he is waking up to high CPAP pressures that he can't tolerate.  Will put in for a pressure change.     Triggering events leading to humiliation, shame, and/or despair (e.g., Loss of relationship, financial or health status) (real or anticipated)

## 2023-11-19 NOTE — ED ADULT TRIAGE NOTE - CHIEF COMPLAINT QUOTE
suicide attempt     pt took approximately 20 mg of otc benzo from [facility] (Sedoxil).  has superficial self inflicted wound to left arm.  admits feeling depressed.  denies previous attempts, homicidal ideation, drus/etoh, auditory visual hallucinations.  denies past medical history.

## 2023-11-19 NOTE — ED PROVIDER NOTE - PHYSICAL EXAMINATION
General: NAD  HEENT: NCAT. PERRL 3mm,   Cardiac: RRR, 2+ radial pulses  Chest: CTA  Abdomen: soft, non-distended, no ttp, no rebound or guarding  L arm: 4 small puncture wounds just above the elbow joint. no active bleeding. non suturable.   Skin: no rashes  Neuro: AAOx3, motor and sensory grossly intact.   Psych: mood and affect depressed

## 2023-11-19 NOTE — ED ADULT NURSE REASSESSMENT NOTE - NS ED NURSE REASSESS COMMENT FT1
Tele tech alerted that patient bradycardiac to low 40. Pt sleeping, upon waking up the patient heart rate returned to 60-70, pt denies any complaints. MD Adair made aware, no new order.

## 2023-11-19 NOTE — ED PROVIDER NOTE - CLINICAL SUMMARY MEDICAL DECISION MAKING FREE TEXT BOX
Leonardo Adair, PGY2 - This is a 38 year old male with no significant pmh presenting after a suicidal attempt at 2:00am which was about 1 hour prior to presentation. Reports that his wife told him that she wanted to get a divorce. He felt sad and depressed. Was alone at the kitchen, took 10 pills of 1mg of Sedoxil (benzodiazapine), stabbed himself on the L arm causing small puncture wounds, but no active bleeding. Will obtain psych labs. will consult tox for clearance. Will have psych involved for suicidal attempt. Most likely psych admission.

## 2023-11-19 NOTE — ED BEHAVIORAL HEALTH ASSESSMENT NOTE - DESCRIPTION
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

## 2023-11-19 NOTE — ED ADULT NURSE NOTE - NSFALLRISKINTERV_ED_ALL_ED
Assistance OOB with selected safe patient handling equipment if applicable/Communicate fall risk and risk factors to all staff, patient, and family/Provide visual cue: yellow wristband, yellow gown, etc/Reinforce activity limits and safety measures with patient and family/Call bell, personal items and telephone in reach/Instruct patient to call for assistance before getting out of bed/chair/stretcher/Non-slip footwear applied when patient is off stretcher/[place] to call system/Physically safe environment - no spills, clutter or unnecessary equipment/Purposeful Proactive Rounding/Room/bathroom lighting operational, light cord in reach Propranolol Counseling:  I discussed with the patient the risks of propranolol including but not limited to low heart rate, low blood pressure, low blood sugar, restlessness and increased cold sensitivity. They should call the office if they experience any of these side effects.

## 2023-11-19 NOTE — ED BEHAVIORAL HEALTH ASSESSMENT NOTE - SUMMARY
38 year-old with no previous psychiatric history, not in treatment, no recent major depressive symptoms (according to wife), no substance misuse, s/p non-premeditated high lethality ingestion of benzodiazepine (20 mg mexazolam) and self inflicted superficial wound to the arm following learning that his wife will be  him. He was found by her in the kitchen, did not leave a note but had not informed her of the ingestion. He is now medically cleared.   Patient is an acute danger to himself and requires inpatient psychiatric hospitalization for treatment and stabilization. 38 year-old with no previous psychiatric history, not in treatment, no recent major depressive symptoms (according to wife), no substance misuse, s/p non-premeditated high lethality ingestion of benzodiazepine (20 mg mexazolam) and self inflicted superficial wound to the arm following learning that his wife will be  him. He was found by her in the kitchen, did not leave a note but had not informed her of the ingestion. He is now medically cleared.   Patient with poor insight, impulsive high-lethality suicide attempt, is an acute danger to himself and requires inpatient psychiatric hospitalization for safety, treatment and stabilization.

## 2023-11-19 NOTE — ED ADULT NURSE NOTE - CHIEF COMPLAINT QUOTE
suicide attempt     pt took approximately 20 mg of otc benzo from [facility] (Sedoxil).  has superficial self inflicted wound to left arm.  admits feeling depressed.  denies previous attempts, homicidal ideation, drus/etoh, auditory visual hallucinations.  denies past medical history.

## 2023-11-19 NOTE — ED BEHAVIORAL HEALTH ASSESSMENT NOTE - ADDITIONAL DETAILS / COMMENTS
patient is awake and alert, sitting up in bed. poor eye contact. limited engagement with exam. decreased speech, simple one or two word answers. no delusions expressed. does not really answer questions about suicidal ideations, dismisses lethality of attempt.

## 2023-11-20 DIAGNOSIS — F29 UNSPECIFIED PSYCHOSIS NOT DUE TO A SUBSTANCE OR KNOWN PHYSIOLOGICAL CONDITION: ICD-10-CM

## 2023-11-20 RX ORDER — ACETAMINOPHEN 500 MG
650 TABLET ORAL EVERY 6 HOURS
Refills: 0 | Status: DISCONTINUED | OUTPATIENT
Start: 2023-11-20 | End: 2023-11-29

## 2023-11-20 RX ORDER — LANOLIN ALCOHOL/MO/W.PET/CERES
3 CREAM (GRAM) TOPICAL AT BEDTIME
Refills: 0 | Status: DISCONTINUED | OUTPATIENT
Start: 2023-11-20 | End: 2023-11-29

## 2023-11-20 NOTE — ED BEHAVIORAL HEALTH PROGRESS NOTE - DETAILS:
Pt feels "okay" this morning. He is fixated on being discharged and expresses remorse for his suicide attempt (calling it "stupid".). He denies any SI/I/P at this time.   It was explained to patient given the severity of his OD, not being in effective treatment he will require psych hospitalization. Pt to be admitted on 9.27

## 2023-11-20 NOTE — ED BEHAVIORAL HEALTH PROGRESS NOTE - CASE SUMMARY/FORMULATION (CLEARLY DOCUMENT RATIONALE FOR DISPOSITION CHANGE)
Has minimal insight into need for treatment, and given high lethality of suicide attempt, pt to be admitted on 9.27

## 2023-11-20 NOTE — ED BEHAVIORAL HEALTH NOTE - BEHAVIORAL HEALTH NOTE
worker called patient's wife Filomena ([phone]) of patient admission to [facility].

## 2023-11-20 NOTE — ED ADULT NURSE REASSESSMENT NOTE - NS ED NURSE REASSESS COMMENT FT1
Pt a&ox4, resting in bed, NAD. Airway is patent, speaking in clear and coherent sentences. Respirations are even and unlabored, no signs of respiratory distress.

## 2023-11-20 NOTE — ED BEHAVIORAL HEALTH PROGRESS NOTE - SUMMARY
38 year-old with no previous psychiatric history, not in treatment, no recent major depressive symptoms (according to wife), no substance misuse, s/p non-premeditated high lethality ingestion of benzodiazepine (20 mg mexazolam) and self inflicted superficial wound to the arm following learning that his wife will be  him. He was found by her in the kitchen, did not leave a note but had not informed her of the ingestion. He is now medically cleared.   Patient with poor insight, impulsive high-lethality suicide attempt, is an acute danger to himself and requires inpatient psychiatric hospitalization for safety, treatment and stabilization.

## 2023-11-21 PROCEDURE — 90834 PSYTX W PT 45 MINUTES: CPT

## 2023-11-21 PROCEDURE — 99222 1ST HOSP IP/OBS MODERATE 55: CPT

## 2023-11-21 RX ORDER — ESCITALOPRAM OXALATE 10 MG/1
5 TABLET, FILM COATED ORAL DAILY
Refills: 0 | Status: DISCONTINUED | OUTPATIENT
Start: 2023-11-21 | End: 2023-11-22

## 2023-11-21 NOTE — BH INPATIENT PSYCHIATRY ASSESSMENT NOTE - HPI (INCLUDE ILLNESS QUALITY, SEVERITY, DURATION, TIMING, CONTEXT, MODIFYING FACTORS, ASSOCIATED SIGNS AND SYMPTOMS)
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 Patient was brought to the ED via ambulance and police last night after being found by his wife with a self-inflicted wound to his arm and after having consumed about 20 1 mg Sedoxil tablets (mexazolam).     Patient is guarded, acknowledges taking several pills and making superficial cuts. he begins interview by saying each day he is in the hospital, he is losing money and if he can't pay rent, the hospital will be guilty of this. He feels he is ready to go home. he minimizes his behavior though he acknowledges several stressors including potentially a divorce and also financially being unable to make ends meet. he is adamant he is not depressed, reports appropriate sleep, appetite. he does endorse feeling guilty and having low energy. denies SI. denies racing thoughts. denies decreased need for sleep. denies psychotic symptoms. denies substance use. he is hesitant but ultimately agrees to trial Lexapro. psychoeducation provided to patient.     Per ED note: Mexazolam is a long-acting benzodiazepine available in [facility] under the brand name Sedoxil. This medication was not prescribed to the patient, but was given to his mother in law who was visiting from [facility] and who left the pills at his home.  Patient provides little history and though awake and alert at the time of exam is rather guarded, offering only one or two word answers to questions, asking to go home. Wife gives the majority of history, reports that she has been discussing divorce with him for some time, and has been  from him in their own home- he sleeps on the couch in the living room. Yesterday evening she told him that she had actually found an  and paid and that the divorce will be happening. He was terribly upset by this news, apparently did not think that divorce would happen. She went into her room and closed the door. When she came out of her room last night to go to the kitchen, she found him on the floor "with blood everywhere, and the empty pill blisters next to him." Her daughter was in her room, and never saw this. She initially called a friend, who came over and then called an ambulance/911. Her  was awake when 911 arrived, was refusing to go to the ED, required police to bring him. Wife reports that before he left for the hospital he told her "I will never forgive you for this," and she found this chilling. She reports that he has never been depressed, but he does have a temper and she was worried about what this statement meant, also worried about him returning home to live with them.   On exam, patient is alert, reports only "I took the pills, I don't think they would kill me." Denies feeling sad or depressed, denies all psychiatric review of systems, does not elaborate at all on circumstances leading up to ingestion, asks only to "go home." He was medically cleared for toxicology, transferred to .

## 2023-11-21 NOTE — BH SOCIAL WORK INITIAL PSYCHOSOCIAL EVALUATION - OTHER PAST PSYCHIATRIC HISTORY (INCLUDE DETAILS REGARDING ONSET, COURSE OF ILLNESS, INPATIENT/OUTPATIENT TREATMENT)
According to assessment, "38 year-old Nepali male , lives in [place] with wife and 4 year-old daughter, no previous psychiatric history, no previous suicide attempts, no substance misuse, no legal issues. Patient was brought to the ED via ambulance and police last night after being found by his wife with a self-inflicted wound to his arm and after having consumed about 20 1 mg Sedoxil tablets (mexazolam). Mexazolam is a long-acting benzodiazepine available in [facility] under the brand name Sedoxil. This medication was not prescribed to the patient, but was given to his mother in law who was visiting from [facility] and who left the pills at his home.  Patient provides little history and though awake and alert at the time of exam is rather guarded, offering only one or two word answers to questions, asking to go home. Wife gives the majority of history, reports that she has been discussing divorce with him for some time, and has been  from him in their own home- he sleeps on the couch in the living room. Yesterday evening she told him that she had actually found an  and paid and that the divorce will be happening. He was terribly upset by this news, apparently did not think that divorce would happen. She went into her room and closed the door. When she came out of her room last night to go to the kitchen, she found him on the floor "with blood everywhere, and the empty pill blisters next to him." Her daughter was in her room, and never saw this. She initially called a friend, who came over and then called an ambulance/911. Her  was awake when 911 arrived, was refusing to go to the ED, required police to bring him. Wife reports that before he left for the hospital he told her "I will never forgive you for this," and she found this chilling. She reports that he has never been depressed, but he does have a temper and she was worried about what this statement meant, also worried about him returning home to live with them."

## 2023-11-21 NOTE — BH INPATIENT PSYCHIATRY ASSESSMENT NOTE - NSBHMETABOLIC_PSY_ALL_CORE_FT
BMI:   HbA1c:   Glucose: POCT Blood Glucose.: 83 mg/dL (11-19-23 @ 02:41)    BP: 115/71 (11-20-23 @ 12:31) (103/75 - 142/96)Vital Signs Last 24 Hrs  T(C): 36.4 (11-20-23 @ 21:44), Max: 36.4 (11-20-23 @ 21:44)  T(F): 97.5 (11-20-23 @ 21:44), Max: 97.5 (11-20-23 @ 21:44)  HR: --  BP: --  BP(mean): --  RR: 18 (11-20-23 @ 17:00) (18 - 18)  SpO2: --    Orthostatic VS  11-20-23 @ 21:44  Lying BP: --/-- HR: --  Sitting BP: 127/82 HR: 77  Standing BP: 121/78 HR: 84  Site: --  Mode: --  Orthostatic VS  11-20-23 @ 17:00  Lying BP: --/-- HR: --  Sitting BP: 136/80 HR: 84  Standing BP: 124/83 HR: 89  Site: --  Mode: --    Lipid Panel:

## 2023-11-21 NOTE — BH INPATIENT PSYCHIATRY ASSESSMENT NOTE - NSBHASSESSSUMMFT_PSY_ALL_CORE
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 Patient was brought to the ED via ambulance and police last night after being found by his wife with a self-inflicted wound to his arm and after having consumed about 20 1 mg Sedoxil tablets (mexazolam).     patient is discharge focused, appears to be minimizing symptoms and severity of his behavior, appears to have a depressed constricted affect, poor eye contact, limited self-care, expressing guilt, psychomotor slow and with recent suicide attempt vs. gesture involving overdose and superficial cuts to arm. patient's presentation aligns with major depressive disorder likely triggered due to several psychosocial stressors. psychoed provided to patient and we agree to trial with Lexapro. hospitalization to assure safety, stabilize symptoms, optimize treatment and coordinate aftercare services. no CO indicated at this time     1. Safety: q15 obs    2. Psychiatric:  -  Start Lexapro 5mg daily   -PRNs: Haldol 5mg + Lorazepam 2mg +/- Benadryl PO or IM for non-redirectable agitation   -Individual, group, milieu therapy  -Psychoeducation provided to patient regarding indication for medications, alternatives, side effects, adherence.    3. Medical:  -PRN: Tylenol 650mg q6-8hr for moderate pain  -Routine labs    4. Disposition: Pending clinical course; coordinate with team social work    5. Legal status: 09.27

## 2023-11-21 NOTE — BH SOCIAL WORK INITIAL PSYCHOSOCIAL EVALUATION - NSHIGHRISKBEHFT_PSY_ALL_CORE
Pt reports that he took a "handful" of benzodiazepines when he was in a panic and that he also cut his arm.

## 2023-11-21 NOTE — BH INPATIENT PSYCHIATRY ASSESSMENT NOTE - MSE UNSTRUCTURED FT
Appearance: Dressed appropriately. fair grooming   Attitude / Behavior / relatedness: superficially cooperative.   Eye contact: fair   Motor: No abnormal movements, no psychomotor slowing or activation.  Speech: Regular rate. soft volume   Mood: "fine"  Affect: constricted appearing and with limited range.   Thought Process: linear.   Thought Content: denies current SI and HI. possible minimization   Perceptual: denies AVH   Insight: limited   Judgment: poor   Impulse control: fair     Cognition: grossly intact  Gait: Intact.

## 2023-11-21 NOTE — PSYCHIATRIC REHAB INITIAL EVALUATION - NSBHPRRECOMMEND_PSY_ALL_CORE
Patient was seen individually by psych rehab staff to orient him to the unit and introduce him to psych rehab department and its functions as well as to assess functioning. Upon approach, patient was willing to meet with psych rehab staff. Patient was provided with a unit schedule and encouraged to attend daily psychiatric rehabilitation groups for improved insight and symptom management. Patient’s appearance was kempt and was observed to be dressed casually. Patient presents as guarded, depressed, but engaged.   When asked his reason for admission, patient stated that he had overdosed on medication. However, patient minimizes his experience by stating that he is okay now and that he needs to go home. This corroborates the ED Behavioral Health Assessment note which states that patient is a: "38 year-old with no previous psychiatric history, not in treatment, no recent major depressive symptoms (according to wife), no substance misuse, s/p non-premeditated high lethality ingestion of benzodiazepine (20 mg mexazolam) and self inflicted superficial wound to the arm following learning that his wife will be  him. He was found by her in the kitchen, did not leave a note but had not informed her of the ingestion. He is now medically cleared. Patient with poor insight, impulsive high-lethality suicide attempt, is an acute danger to himself and requires inpatient psychiatric hospitalization for safety, treatment and stabilization".   Patient and psych rehab staff were unable to identify a collaborative goal, therefore one was selected for the patient. Psych rehab staff will provide counseling and daily group therapy to assist patient in achieving therapeutic goals. Psych rehab staff will also continue to engage patient daily in order to build therapeutic rapport. Patient denies SI/HI/AH/VH. Psych rehab recommends that patient attend individual and group therapy for support, psychoeducation and skill-integration as well as to facilitate progress towards specified goal.

## 2023-11-21 NOTE — BH INPATIENT PSYCHIATRY ASSESSMENT NOTE - NS ED [facility] MED ROS PSYCHIATRIC
How Severe Is Your Skin Cancer?: mild Is This A New Presentation, Or A Follow-Up?: Basal Cell Carcinoma Location From Outside Provider (Will Override Previously Chosen Location): Right medial forehead See HPI

## 2023-11-21 NOTE — BH INPATIENT PSYCHIATRY ASSESSMENT NOTE - RISK ASSESSMENT
Risk factors for self-harm / harm to others: sex, psychiatric diagnosis, hx of SA vs. NSSIB   Protective factors: inpatient and in a controlled setting with limited access to lethal means. not endorsing harm to self or others, able to contract for safety.

## 2023-11-21 NOTE — BH INPATIENT PSYCHIATRY ASSESSMENT NOTE - CURRENT MEDICATION
MEDICATIONS  (STANDING):  escitalopram 5 milliGRAM(s) Oral daily    MEDICATIONS  (PRN):  acetaminophen     Tablet .. 650 milliGRAM(s) Oral every 6 hours PRN Mild Pain (1 - 3), Moderate Pain (4 - 6)  LORazepam     Tablet 2 milliGRAM(s) Oral every 6 hours PRN Agitation  LORazepam   Injectable 2 milliGRAM(s) IntraMuscular Once PRN severe agitation  melatonin. 3 milliGRAM(s) Oral at bedtime PRN Insomnia

## 2023-11-21 NOTE — BH INPATIENT PSYCHIATRY ASSESSMENT NOTE - NSBHCHARTREVIEWVS_PSY_A_CORE FT
Vital Signs Last 24 Hrs  T(C): 36.4 (11-20-23 @ 21:44), Max: 36.4 (11-20-23 @ 21:44)  T(F): 97.5 (11-20-23 @ 21:44), Max: 97.5 (11-20-23 @ 21:44)  HR: --  BP: --  BP(mean): --  RR: 18 (11-20-23 @ 17:00) (18 - 18)  SpO2: --    Orthostatic VS  11-20-23 @ 21:44  Lying BP: --/-- HR: --  Sitting BP: 127/82 HR: 77  Standing BP: 121/78 HR: 84  Site: --  Mode: --  Orthostatic VS  11-20-23 @ 17:00  Lying BP: --/-- HR: --  Sitting BP: 136/80 HR: 84  Standing BP: 124/83 HR: 89  Site: --  Mode: --

## 2023-11-22 PROCEDURE — 90853 GROUP PSYCHOTHERAPY: CPT

## 2023-11-22 PROCEDURE — 99232 SBSQ HOSP IP/OBS MODERATE 35: CPT

## 2023-11-22 RX ORDER — HYDROCORTISONE 1 %
1 OINTMENT (GRAM) TOPICAL ONCE
Refills: 0 | Status: DISCONTINUED | OUTPATIENT
Start: 2023-11-22 | End: 2023-11-29

## 2023-11-22 RX ORDER — HYDROCORTISONE 1 %
1 OINTMENT (GRAM) TOPICAL DAILY
Refills: 0 | Status: DISCONTINUED | OUTPATIENT
Start: 2023-11-22 | End: 2023-11-29

## 2023-11-22 RX ORDER — ESCITALOPRAM OXALATE 10 MG/1
10 TABLET, FILM COATED ORAL AT BEDTIME
Refills: 0 | Status: DISCONTINUED | OUTPATIENT
Start: 2023-11-23 | End: 2023-11-29

## 2023-11-22 RX ADMIN — ESCITALOPRAM OXALATE 5 MILLIGRAM(S): 10 TABLET, FILM COATED ORAL at 09:20

## 2023-11-22 NOTE — BH INPATIENT PSYCHIATRY PROGRESS NOTE - NSBHASSESSSUMMFT_PSY_ALL_CORE
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 Patient was brought to the ED via ambulance and police last night after being found by his wife with a self-inflicted wound to his arm and after having consumed about 20 1 mg Sedoxil tablets (mexazolam).     patient is discharge focused, appears to be minimizing symptoms and severity of his behavior, appears to have a depressed constricted affect, poor eye contact, limited self-care, expressing guilt, psychomotor slow and with recent suicide attempt vs. gesture involving overdose and superficial cuts to arm. patient's presentation aligns with major depressive disorder likely triggered due to several psychosocial stressors. psychoed provided to patient and we agree to trial with Lexapro. hospitalization to assure safety, stabilize symptoms, optimize treatment and coordinate aftercare services. no CO indicated at this time     1. Safety: q15 obs    2. Psychiatric:  - Increase Lexapro to 10mg qhs  -PRNs: Haldol 5mg + Lorazepam 2mg +/- Benadryl PO or IM for non-redirectable agitation   -Individual, group, milieu therapy  -Psychoeducation provided to patient regarding indication for medications, alternatives, side effects, adherence.    3. Medical:  -PRN: Tylenol 650mg q6-8hr for moderate pain  -Routine labs    4. Disposition: Pending clinical course; coordinate with team social work    5. 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 Patient was brought to the ED via ambulance and police last night after being found by his wife with a self-inflicted wound to his arm and after having consumed about 20 1 mg Sedoxil tablets (mexazolam).     patient is discharge focused, appears to be minimizing symptoms and severity of his behavior, appears to have a depressed constricted affect, poor eye contact, limited self-care, expressing guilt, psychomotor slow and with recent suicide attempt vs. gesture involving overdose and superficial cuts to arm. patient's presentation aligns with major depressive disorder likely triggered due to several psychosocial stressors. psychoed provided to patient and we agree to trial with Lexapro. hospitalization to assure safety, stabilize symptoms, optimize treatment and coordinate aftercare services. no CO indicated at this time     Patient feels his mood is good. Pt potentially minimizing his sx. He is complying with his medication and has not had any major a/e to medications. Given this, will increase Lexapro to 10mg and switch administration to bedtime to help with the sleepiness he has been experiencing. Additionally, will order hydrocortisone cream for the patient's rash. He denies any SI or HI. No AVH or psychosis/ manic sx. He is in good behavioral control. c/w plan as below     1. Safety: q15 obs    2. Psychiatric:  - Increase Lexapro to 10mg qhs  -PRNs: Haldol 5mg + Lorazepam 2mg +/- Benadryl PO or IM for non-redirectable agitation   -Individual, group, milieu therapy  -Psychoeducation provided to patient regarding indication for medications, alternatives, side effects, adherence.    3. Medical:  -Standing: Hydrocortisone 1% cream for RLL rash  -PRN: Tylenol 650mg q6-8hr for moderate pain  -Routine labs    4. Disposition: Pending clinical course; coordinate with team social work    5. Legal status: 09.27

## 2023-11-22 NOTE — BH INPATIENT PSYCHIATRY PROGRESS NOTE - CURRENT MEDICATION
MEDICATIONS  (STANDING):  hydrocortisone 1% Cream 1 Application(s) Topical daily    MEDICATIONS  (PRN):  acetaminophen     Tablet .. 650 milliGRAM(s) Oral every 6 hours PRN Mild Pain (1 - 3), Moderate Pain (4 - 6)  LORazepam     Tablet 2 milliGRAM(s) Oral every 6 hours PRN Agitation  LORazepam   Injectable 2 milliGRAM(s) IntraMuscular Once PRN severe agitation  melatonin. 3 milliGRAM(s) Oral at bedtime PRN Insomnia

## 2023-11-22 NOTE — BH PSYCHOLOGY - GROUP THERAPY NOTE - NSPSYCHOLGRPCOGPT_PSY_A_CORE FT
Patient attended Cognitive Behavioral Therapy Group incorporating ACT-based concepts. The group started with a brief check-in asking pts about the animal they would be; as well as a brief stretching exercise. The group was then engaged in a loving mindfulness meditation, as well as a worksheet discussing values. Group facilitator explained concepts, reinforced participation, and engaged patients in the discussion.  Patient attended Cognitive Behavioral Therapy Group incorporating ACT-based concepts. The group started with a brief check-in asking pts about their favorite animal/pet they own; as well as a brief stretching exercise. The group was then engaged in a loving mindfulness meditation, as well as a worksheet discussing values. Group facilitator explained concepts, reinforced participation, and engaged patients in the discussion.

## 2023-11-22 NOTE — BH PSYCHOLOGY - GROUP THERAPY NOTE - NSBHPSYCHOLRESPCOMMENT_PSY_A_CORE FT
The patient appeared adequately groomed and casually dressed. Pt appeared engaged in the group as . evidenced by their willingness to independently verbally communicate during the discussion. Pt talked about values he holds in different areas of his life as well as how values differ across generations and environments. Pt shared that he values loyalty and honesty as a friend. Pt was oriented X3. Pt was appropriate with peers. The patient appeared adequately groomed and casually dressed. Pt appeared engaged in the group as evidenced by their willingness to independently verbally communicate during the discussion. Pt talked about values he holds in different areas of his life as well as how values differ across generations and environments. Pt shared that he values loyalty and honesty as a friend. Pt was oriented X3. Pt was appropriate with peers.

## 2023-11-23 PROCEDURE — 99232 SBSQ HOSP IP/OBS MODERATE 35: CPT

## 2023-11-23 RX ADMIN — Medication 1 APPLICATION(S): at 08:41

## 2023-11-23 RX ADMIN — ESCITALOPRAM OXALATE 10 MILLIGRAM(S): 10 TABLET, FILM COATED ORAL at 20:24

## 2023-11-23 NOTE — BH INPATIENT PSYCHIATRY PROGRESS NOTE - NSBHASSESSSUMMFT_PSY_ALL_CORE
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 Patient was brought to the ED via ambulance and police last night after being found by his wife with a self-inflicted wound to his arm and after having consumed about 20 1 mg Sedoxil tablets (mexazolam).     patient is discharge focused, appears to be minimizing symptoms and severity of his behavior, appears to have a depressed constricted affect, poor eye contact, limited self-care, expressing guilt, psychomotor slow and with recent suicide attempt vs. gesture involving overdose and superficial cuts to arm. patient's presentation aligns with major depressive disorder likely triggered due to several psychosocial stressors. psychoed provided to patient and we agree to trial with Lexapro. hospitalization to assure safety, stabilize symptoms, optimize treatment and coordinate aftercare services. no CO indicated at this time     Patient feels his mood is good. Pt potentially minimizing his sx. He is complying with his medication and has not had any major a/e to medications. Given this, will increase Lexapro to 10mg and switch administration to bedtime to help with the sleepiness he has been experiencing. Additionally, will order hydrocortisone cream for the patient's rash. He denies any SI or HI. No AVH or psychosis/ manic sx. He is in good behavioral control. c/w plan as below     1. Safety: q15 obs    2. Psychiatric:  - Increase Lexapro to 10mg qhs  -PRNs: Haldol 5mg + Lorazepam 2mg +/- Benadryl PO or IM for non-redirectable agitation   -Individual, group, milieu therapy  -Psychoeducation provided to patient regarding indication for medications, alternatives, side effects, adherence.    3. Medical:  -Standing: Hydrocortisone 1% cream for RLL rash  -PRN: Tylenol 650mg q6-8hr for moderate pain  -Routine labs    4. Disposition: Pending clinical course; coordinate with team social work    5. Legal status: 09.27

## 2023-11-23 NOTE — BH INPATIENT PSYCHIATRY PROGRESS NOTE - CURRENT MEDICATION
MEDICATIONS  (STANDING):  escitalopram 10 milliGRAM(s) Oral at bedtime  hydrocortisone 1% Cream 1 Application(s) Topical once  hydrocortisone 1% Cream 1 Application(s) Topical daily    MEDICATIONS  (PRN):  acetaminophen     Tablet .. 650 milliGRAM(s) Oral every 6 hours PRN Mild Pain (1 - 3), Moderate Pain (4 - 6)  LORazepam     Tablet 2 milliGRAM(s) Oral every 6 hours PRN Agitation  LORazepam   Injectable 2 milliGRAM(s) IntraMuscular Once PRN severe agitation  melatonin. 3 milliGRAM(s) Oral at bedtime PRN Insomnia

## 2023-11-23 NOTE — BH INPATIENT PSYCHIATRY PROGRESS NOTE - NSBHPROGMEDSE_PSY_A_CORE FT
Lexapro started yesterday, patient has some daytime sleepiness. Otherwise, no other side effects or adverse reactions noted. 
Lexapro started this morning, patient has some daytime sleepiness. Otherwise, no other side effects or adverse reactions noted.

## 2023-11-24 PROCEDURE — 99231 SBSQ HOSP IP/OBS SF/LOW 25: CPT

## 2023-11-24 RX ADMIN — Medication 1 APPLICATION(S): at 08:11

## 2023-11-24 RX ADMIN — ESCITALOPRAM OXALATE 10 MILLIGRAM(S): 10 TABLET, FILM COATED ORAL at 22:21

## 2023-11-24 NOTE — BH TREATMENT PLAN - NSTXDEPRESINTERPR_PSY_ALL_CORE
Psych rehab recommends that patient attend individual and group therapy for support, psychoeducation and skill-integration as well as to facilitate progress towards specified goal.

## 2023-11-24 NOTE — BH TREATMENT PLAN - NSPTSTATEDGOAL_PSY_ALL_CORE
"I want to begin seeing a psychiatrist and a therapist". 
"I want to begin seeing a psychiatrist and a therapist".

## 2023-11-24 NOTE — BH INPATIENT PSYCHIATRY PROGRESS NOTE - NSBHASSESSSUMMFT_PSY_ALL_CORE
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 Patient was brought to the ED via ambulance and police last night after being found by his wife with a self-inflicted wound to his arm and after having consumed about 20 1 mg Sedoxil tablets (mexazolam).     improving mood noted with a more reactive affect. pt tolerating Lexapro. due to recent GI related changes from Lexapro and with ongoing mood improvements, will maintain lexapro at 10mg daily     1. Safety: q15 obs    2. Psychiatric:  - continue Lexapro to 10mg qhs  -PRNs: Haldol 5mg + Lorazepam 2mg +/- Benadryl PO or IM for non-redirectable agitation   -Individual, group, milieu therapy  -Psychoeducation provided to patient regarding indication for medications, alternatives, side effects, adherence.    3. Medical:  -Standing: Hydrocortisone 1% cream for RLL rash  -PRN: Tylenol 650mg q6-8hr for moderate pain  -Routine labs    4. Disposition: pt declines PHP.     5. Legal status: 9.27

## 2023-11-24 NOTE — BH TREATMENT PLAN - NSTXCOPEINTERRN_PSY_ALL_CORE
will use coping skills
encouraged patient to explore effective coping skills and relaxation techniques

## 2023-11-25 PROCEDURE — 99231 SBSQ HOSP IP/OBS SF/LOW 25: CPT

## 2023-11-25 RX ADMIN — Medication 1 APPLICATION(S): at 13:55

## 2023-11-25 RX ADMIN — ESCITALOPRAM OXALATE 10 MILLIGRAM(S): 10 TABLET, FILM COATED ORAL at 21:34

## 2023-11-25 NOTE — BH INPATIENT PSYCHIATRY PROGRESS NOTE - NSBHASSESSSUMMFT_PSY_ALL_CORE
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 Patient was brought to the ED via ambulance and police last night after being found by his wife with a self-inflicted wound to his arm and after having consumed about 20 1 mg Sedoxil tablets (mexazolam).     improving mood noted with a more reactive affect. pt tolerating Lexapro. due to recent GI related changes from Lexapro and with ongoing mood improvements, will maintain lexapro at 10mg daily     1. Safety: q15 obs    2. Psychiatric:  - continue Lexapro to 10mg qhs  -PRNs: Haldol 5mg + Lorazepam 2mg +/- Benadryl PO or IM for non-redirectable agitation   -Individual, group, milieu therapy  -Psychoeducation provided to patient regarding indication for medications, alternatives, side effects, adherence.    3. Medical:  -Standing: Hydrocortisone 1% cream for RLL rash  -PRN: Tylenol 650mg q6-8hr for moderate pain  -Routine labs    4. Disposition: pt declines PHP.     5. Legal status: 9.27

## 2023-11-26 RX ADMIN — Medication 1 APPLICATION(S): at 09:17

## 2023-11-26 RX ADMIN — ESCITALOPRAM OXALATE 10 MILLIGRAM(S): 10 TABLET, FILM COATED ORAL at 20:13

## 2023-11-27 PROCEDURE — 90834 PSYTX W PT 45 MINUTES: CPT | Mod: 59

## 2023-11-27 PROCEDURE — 90853 GROUP PSYCHOTHERAPY: CPT

## 2023-11-27 PROCEDURE — 99231 SBSQ HOSP IP/OBS SF/LOW 25: CPT

## 2023-11-27 RX ADMIN — ESCITALOPRAM OXALATE 10 MILLIGRAM(S): 10 TABLET, FILM COATED ORAL at 21:42

## 2023-11-27 RX ADMIN — Medication 1 APPLICATION(S): at 08:10

## 2023-11-27 NOTE — BH INPATIENT PSYCHIATRY PROGRESS NOTE - NSBHASSESSSUMMFT_PSY_ALL_CORE
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 Patient was brought to the ED via ambulance and police last night after being found by his wife with a self-inflicted wound to his arm and after having consumed about 20 1 mg Sedoxil tablets (mexazolam).     Patient noted to have improving mood and a more reactive affect. Pt tolerating Lexapro with no associated side effects since taking it at night. He denies any SI or HI. No AVH or psychosis/ manic sx. He is in good behavioral control. c/w plan as below    1. Safety: q15 obs    2. Psychiatric:  - continue Lexapro to 10mg qhs  -PRNs: Haldol 5mg + Lorazepam 2mg +/- Benadryl PO or IM for non-redirectable agitation   -Individual, group, milieu therapy  -Psychoeducation provided to patient regarding indication for medications, alternatives, side effects, adherence.    3. Medical:  -Standing: Hydrocortisone 1% cream for RLL rash  -PRN: Tylenol 650mg q6-8hr for moderate pain  -Routine labs    4. Disposition: pt declines PHP. Aftercare scheduling in process.    5. Legal status: 9.27

## 2023-11-27 NOTE — BH PSYCHOLOGY - GROUP THERAPY NOTE - NSPSYCHOLGRPCOGPT_PSY_A_CORE FT
Patient attended Cognitive Behavioral Therapy Group incorporating ACT-based concepts. The group started with a brief check-in asking Pts to share a scent that brings them back to a pleasant memory. Group members were then encouraged to reflect on the “passengers on a bus” metaphor. Lastly, Group focused on discussing examples of opposites that can both be true (ex. wanting support and being independent, being mad at others and loving/respecting them, being with others while also feeling lonely, sharing some things and keeping other things private). Group facilitator explained concepts, reinforced participation, and engaged patients in the discussion.

## 2023-11-27 NOTE — BH INPATIENT PSYCHIATRY PROGRESS NOTE - CURRENT MEDICATION
MEDICATIONS  (STANDING):  escitalopram 10 milliGRAM(s) Oral at bedtime  hydrocortisone 1% Cream 1 Application(s) Topical daily  hydrocortisone 1% Cream 1 Application(s) Topical once    MEDICATIONS  (PRN):  acetaminophen     Tablet .. 650 milliGRAM(s) Oral every 6 hours PRN Mild Pain (1 - 3), Moderate Pain (4 - 6)  LORazepam     Tablet 2 milliGRAM(s) Oral every 6 hours PRN Agitation  LORazepam   Injectable 2 milliGRAM(s) IntraMuscular Once PRN severe agitation  melatonin. 3 milliGRAM(s) Oral at bedtime PRN Insomnia   MEDICATIONS  (STANDING):  escitalopram 10 milliGRAM(s) Oral at bedtime  hydrocortisone 1% Cream 1 Application(s) Topical once  hydrocortisone 1% Cream 1 Application(s) Topical daily    MEDICATIONS  (PRN):  acetaminophen     Tablet .. 650 milliGRAM(s) Oral every 6 hours PRN Mild Pain (1 - 3), Moderate Pain (4 - 6)  LORazepam     Tablet 2 milliGRAM(s) Oral every 6 hours PRN Agitation  LORazepam   Injectable 2 milliGRAM(s) IntraMuscular Once PRN severe agitation  melatonin. 3 milliGRAM(s) Oral at bedtime PRN Insomnia

## 2023-11-27 NOTE — BH PSYCHOLOGY - GROUP THERAPY NOTE - NSBHPSYCHOLRESPCOMMENT_PSY_A_CORE FT
The patient appeared adequately groomed and casually dressed. Pt appeared very engaged in the group as evidenced by his willingness to independently verbally communicate during the discussion. Pt volunteered to read aloud from worksheet. Pt reflected on the importance of trusting others enough to ask for help/depend on them. Pt also highlighted the importance of accepting “roadblocks” in order to make changes/improve a stressful situation. Speech WNL. PT was oriented X3. Pt was appropriate and supportive with peers.

## 2023-11-28 VITALS — TEMPERATURE: 98 F | RESPIRATION RATE: 18 BRPM

## 2023-11-28 PROCEDURE — 90853 GROUP PSYCHOTHERAPY: CPT

## 2023-11-28 PROCEDURE — 99231 SBSQ HOSP IP/OBS SF/LOW 25: CPT

## 2023-11-28 RX ADMIN — Medication 1 APPLICATION(S): at 09:09

## 2023-11-28 RX ADMIN — ESCITALOPRAM OXALATE 10 MILLIGRAM(S): 10 TABLET, FILM COATED ORAL at 20:46

## 2023-11-28 NOTE — BH PSYCHOLOGY - GROUP THERAPY NOTE - NSBHPSYCHOLRESPCOMMENT_PSY_A_CORE FT
The patient appeared adequately groomed and casually dressed. Pt appeared engaged in the group as evidenced by their willingness to independently verbally communicate during the discussion. Pt talked about avoidance strategies he's used in the past such as distraction (working), denial and projection, and reflected on how avoiding and "pushing away" his emotions have negatively impacted him and his family. Pt talked about the dynamic of close interpersonal relationships in being more triggering. Pt was oriented X3. Pt was appropriate with peers.

## 2023-11-28 NOTE — BH SAFETY PLAN - PHONE
Goal Outcome Evaluation:  Plan of Care Reviewed With: patient        Progress: improving  Outcome Summary: Patient is A&O x4 this shift, educated to call before getting out of bed to prevent falling, pt verbalized understanding   see attached

## 2023-11-28 NOTE — BH DISCHARGE NOTE NURSING/SOCIAL WORK/PSYCH REHAB - DISCHARGE INSTRUCTIONS AFTERCARE APPOINTMENTS
In order to check the location, date, or time of your aftercare appointment, please refer to your Discharge Instructions Document given to you upon leaving the hospital.  If you have lost the instructions please call [phone]

## 2023-11-28 NOTE — BH DISCHARGE NOTE NURSING/SOCIAL WORK/PSYCH REHAB - PATIENT PORTAL LINK FT
You can access the FollowMyHealth Patient Portal offered by [facility] by registering at the following website: [email or web address]. By joining [facility]’s FollowMyHealth portal, you will also be able to view your health information using other applications (apps) compatible with our system.

## 2023-11-28 NOTE — BH DISCHARGE NOTE NURSING/SOCIAL WORK/PSYCH REHAB - NSCDUDCCRISIS_PSY_A_CORE
.National Suicide Prevention Lifeline 4 [phone]) 505-5717/.  [employer]  1 (807) LIFENET (888-7945)/.  [facility]  [address] [place] [id number] [phone]   Hours:  Monday through Friday from 9 AM to 3 PM/988 Suicide and Crisis Lifeline

## 2023-11-28 NOTE — BH DISCHARGE NOTE NURSING/SOCIAL WORK/PSYCH REHAB - NSDCPLANREVIEWED_PSY_ALL_CORE
Lab Results   Component Value Date    HGBA1C 6 6 (H) 06/20/2021       Recent Labs     07/30/21  1113 07/30/21  1629 07/31/21  0755 07/31/21  1021   POCGLU 316* 152* 177* 303*       Blood Sugar Average: Last 72 hrs:  (P) 262 [id number]    Episodes hyperglycemia  Continue Lantus HS  Humalog t i d   With meals  Titrate insulin dose based on Accu-Cheks  Avoid hypoglycemia  Hypoglycemia protocol in place Yes

## 2023-11-28 NOTE — BH DISCHARGE NOTE NURSING/SOCIAL WORK/PSYCH REHAB - NSDCVIVACCINE_GEN_ALL_CORE_FT
Tdap; 19-Nov-2023 04:11; Tiara Zaman (RN); [employer]; [id number] (Exp. Date: 23-Nov-2025); IntraMuscular; Deltoid Left.; 0.5 milliLiter(s); VIS (VIS Published: 09-May-2013, VIS Presented: 19-Nov-2023);

## 2023-11-28 NOTE — BH DISCHARGE NOTE NURSING/SOCIAL WORK/PSYCH REHAB - NSDCPRGOAL_PSY_ALL_CORE
Upon approach, patient was willing to meet with writer to discuss recovery progress. Patient was calm throughout meeting. When asked about plans for after discharge, patient stated he's looking forward to returning to work. Patient was knowledgeable on outpatient treatment and stated his intentions to follow it. When asked to compare how he felt today to how he felt when first admitted, patient endorses significant improvement in symptoms such as better mood and having a more positive outlook on life. Overall, patient was cooperative and feels optimistic about recovery. Patient denies AH/VH/SI/HI.   During hospitalization, patient initially was rarely active in the milieu and attended few groups. However, as patient's hospitalization progressed, patient became active in the milieu and attended many groups. In groups patient was meaningfully engaged and participated frequently. Patient was also observed to be socializing with select peers. Patient was compliant with treatment and maintained good behavioral control on the unit. Patient is kempt and has been seen dressed casually. Patient was compliant in safety planning and was provided with a Press-Ganey survey. By the end of hospitalization, patient did meet his goal of attending and participating in 2 groups daily despite low mood/energy. Patient has also demonstrated better insight and judgement, as well as future-oriented thinking.

## 2023-11-28 NOTE — BH PSYCHOLOGY - GROUP THERAPY NOTE - NSPSYCHOLGRPCOGPT_PSY_A_CORE FT
Patient attended Cognitive Behavioral Therapy Group incorporating ACT-based concepts. The group started with a brief check-in asking pts about their favorite holiday movie. The group was then engaged in a mindfulness meditation, as well as a worksheet discussing avoidance. Group facilitator explained concepts, reinforced participation, and engaged patients in the discussion.

## 2023-11-28 NOTE — BH INPATIENT PSYCHIATRY PROGRESS NOTE - NSBHATTESTCOMMENTATTENDFT_PSY_A_CORE
As per staff report, pt minimizing events leading to admission, however acknowledging severity of marital stressors.  Pt on interview states he has a pruritic rash on his RLE, physical exam no clear erythema or swelling.  Will provide hydrocortisone cream.  Pt states he has been in contact with his family and things are better.  Pt states escitalopram appears to make him tired, will move into evening for tomorrow and titrate to 10 mg.
Patient reports feeling better each day. He reflects that therapy has been a profound eye opening experience and he did not expect himself to appreciate it as much as he is. He looks forward to hopefully going home soon to see his family and is interested in continuing therapy as an outpatient. He regrets his behavior leading to hospitalization, is glad it was not any worse. He lists his daughter as a strong protective factor and he firmly denies SI. No a/e to Lexapro. Exam-wise patient is bright, reactive, future oriented, and with improved insight and judgment.  
Patient feels his mood continues to improve. He feels medications + milieu helping and he is engaging actively in therapy. He denies SI. He expresses hopeful and future oriented thinking. He reflects on and appreciates the skills he has been learning while here. Will c/w Lexapro as ordered. Aftercare planning in progress

## 2023-11-28 NOTE — BH INPATIENT PSYCHIATRY PROGRESS NOTE - NSBHASSESSSUMMFT_PSY_ALL_CORE
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 Patient was brought to the ED via ambulance and police last night after being found by his wife with a self-inflicted wound to his arm and after having consumed about 20 1 mg Sedoxil tablets (mexazolam).     Patient has improved mood and a more reactive affect. He has been adherent to medication and has no side effects. He denies any SI or HI. No AVH or psychosis/ manic sx. He is in good behavioral control. Hospitalization due to need for aftercare and planning is in progress. c/w plan as below    1. Safety: q15 obs    2. Psychiatric:  - continue Lexapro to 10mg qhs  -PRNs: Haldol 5mg + Lorazepam 2mg +/- Benadryl PO or IM for non-redirectable agitation   -Individual, group, milieu therapy  -Psychoeducation provided to patient regarding indication for medications, alternatives, side effects, adherence.    3. Medical:  -Standing: Hydrocortisone 1% cream for RLL rash  -PRN: Tylenol 650mg q6-8hr for moderate pain  -Routine labs    4. Disposition: pt declines PHP. Aftercare scheduling in process.    5. 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 Patient was brought to the ED via ambulance and police last night after being found by his wife with a self-inflicted wound to his arm and after having consumed about 20 1 mg Sedoxil tablets (mexazolam).     Patient has improved mood and a more reactive affect. He has been adherent to medication and has no side effects. He denies any SI or HI. No AVH or psychosis/ manic sx. He is in good behavioral control. Hospitalization due to need for aftercare and planning is in progress. c/w plan as below    1. Safety: q15 obs    2. Psychiatric:  - continue Lexapro to 10mg qhs  -PRNs: Haldol 5mg + Lorazepam 2mg +/- Benadryl PO or IM for non-redirectable agitation   -Individual, group, milieu therapy  -Psychoeducation provided to patient regarding indication for medications, alternatives, side effects, adherence.    3. Medical:  -Standing: Hydrocortisone 1% cream for RLL rash  -PRN: Tylenol 650mg q6-8hr for moderate pain  -Routine labs    4. Disposition: pt declines PHP. Aftercare scheduling in process.    5. Legal status: 9.27

## 2023-11-28 NOTE — BH INPATIENT PSYCHIATRY PROGRESS NOTE - NSBHATTESTBILLING_PSY_A_CORE
68021-Gsfebjvzgm OBS or IP - low complexity OR 25-34 mins
62668-Nwajrsjiai OBS or IP - low complexity OR 25-34 mins
03511-Ccdelpunwu OBS or IP - low complexity OR 25-34 mins
55788-Jgfwdyjtkv OBS or IP - low complexity OR 25-34 mins
00023-Ktccmrrlzy OBS or IP - moderate complexity OR 35-49 mins
99557-Tlxlawhvjd OBS or IP - low complexity OR 25-34 mins

## 2023-11-29 PROCEDURE — 90832 PSYTX W PT 30 MINUTES: CPT | Mod: 59

## 2023-11-29 PROCEDURE — 99238 HOSP IP/OBS DSCHRG MGMT 30/<: CPT

## 2023-11-29 PROCEDURE — 90853 GROUP PSYCHOTHERAPY: CPT

## 2023-11-29 RX ORDER — ESCITALOPRAM OXALATE 10 MG/1
1 TABLET, FILM COATED ORAL
Qty: 14 | Refills: 0
Start: 2023-11-29 | End: 2023-12-12

## 2023-11-29 RX ADMIN — Medication 1 APPLICATION(S): at 08:07

## 2023-11-29 NOTE — BH INPATIENT PSYCHIATRY PROGRESS NOTE - NSBHFUPINTERVALCCFT_PSY_A_CORE
follow up mood

## 2023-11-29 NOTE — BH PSYCHOLOGY - CLINICIAN PSYCHOTHERAPY NOTE - NSBHPSYCHOLINT_PSY_A_CORE
Cognitive/behavioral therapy/Dialectical  Behavioral Therapy (DBT)/Dynamic issues addressed/Supported coping skills/Supportive therapy/other...
Cognitive/behavioral therapy/Dialectical  Behavioral Therapy (DBT)/Dynamic issues addressed/Supported coping skills/Supportive therapy
Cognitive/behavioral therapy/Dialectical  Behavioral Therapy (DBT)/Dynamic issues addressed/Supported coping skills/Supportive therapy/other...

## 2023-11-29 NOTE — BH INPATIENT PSYCHIATRY DISCHARGE NOTE - NSBHMETABOLIC_PSY_ALL_CORE_FT
BMI:   HbA1c:   Glucose: POCT Blood Glucose.: 83 mg/dL (11-19-23 @ 02:41)    BP: --Vital Signs Last 24 Hrs  T(C): --  T(F): --  HR: --  BP: --  BP(mean): --  RR: --  SpO2: --    Orthostatic VS  11-28-23 @ 08:11  Lying BP: --/-- HR: --  Sitting BP: 122/78 HR: 60  Standing BP: 122/74 HR: 62  Site: upper right arm  Mode: electronic    Lipid Panel:

## 2023-11-29 NOTE — BH PSYCHOLOGY - GROUP THERAPY NOTE - NSBHPSYCHOLRESPCOMMENT_PSY_A_CORE FT
The patient appeared adequately groomed and casually dressed. Pt appeared engaged in the group as  evidenced by their willingness to independently verbally communicate during the discussion. Pt talked about difficulties being in the present when being fused with thoughts as well as how "placing thoughts on a cloud and using the filing away technique" would be something he imagine being helpful in defusing from thoughts. Pt was oriented X3. Pt was appropriate with peers.

## 2023-11-29 NOTE — BH PSYCHOLOGY - GROUP THERAPY NOTE - NSPSYCHOLGRPCOGPT_PSY_A_CORE FT
Patient attended Cognitive Behavioral Therapy Group incorporating ACT-based concepts. The group started with a brief check-in asking pts to describe their mood in terms of the weather. The group was then engaged in a mindfulness meditation, as well as a worksheet discussing thought defusion. Group facilitator explained concepts, reinforced participation, and engaged patients in the discussion.

## 2023-11-29 NOTE — BH INPATIENT PSYCHIATRY PROGRESS NOTE - NSBHFUPINTERVALHXFT_PSY_A_CORE
Chart reviewed including pertinent labs, imaging, ekg. case discussed with nursing team.  Pt says that he's doing well today. He says that he hasn't had anymore side effects from the lexapro and hopes that it will help "calm his brain" since it's "always going." He slept well last night. He denies any SI.
Chart reviewed including pertinent labs, imaging, ekg. case discussed with treatment team.  Over this interval: Patient reports mood is improving. He is enthusiastic about continuing care and states that the Lexapro and therapy have really helped him. He states he wishes he had been exposed to therapy many years ago and that would have helped him be in a better place today. He denies SI or HI. Denies manic or psychotic symptoms. No PRN or STAT meds given during this interval.
Chart reviewed including pertinent labs, imaging, ekg. case discussed with treatment team.  Over this interval: Patient extremely discharge focused today. States that he now knows that he needs to take his work life more slowly, but that staying here longer will make that harder to do. He says he couldn't sleep that well because he was worried about getting back to work soon. He denies SI or HI. Denies manic or psychotic symptoms. No PRN or STAT meds given during this interval.
Chart reviewed, case discussed with nursing staff.   Patient seen and interviewed. Pt states that the door being slammed by staff every 15 minutes wakes him up throughout the night. He says that he "regrets deeply" his suicide attempt and that he's been thinking about this quite a bit. He worries about feeling sick from the lexapro. No current SI. No other problems with meds. 
Chart reviewed including pertinent labs, imaging, ekg. case discussed with treatment team.  Over this interval: patient reports mood is improving. He acknowledges his actions were a mistake and that he is glad his daughter did not get to see him like that. He is thankful his wife called for help and took care of him. He is looking forward to continuing care as an outpatient. He denies SI or HI. Denies manic or psychotic symptoms. He feels like the lexapro has greatly helped him and he is no longer experiencing side effects from it. No PRN or STAT meds given during this interval. 
Chart reviewed including pertinent labs, imaging, ekg. case discussed with treatment team.  Patient seen and interviewed. Pt states he is doing fine. He is notably less discharge focused than yesterday. He started Lexapro today and is experiencing some daytime sleepiness. Otherwise is tolerating it well. Pt noticed he has an itchy rash in his right lower leg that.  He states he slept well and has good appetite. He denies SI and HI. Denies AVH. Denies manic or psychotic sx. No PRNs or STAT meds needed during this interval.
Chart reviewed including pertinent labs, imaging, ekg. case discussed with treatment team.  Over this interval: patient reports mood is improving. he meaningfully acknowledges his impulsive behavior, expresses genuine remorse and feels it was "silly' that he did that. He asks about aftercare and verbalizes interest in therapy. he declines PHP states his job would not allow for time commitment that PHP entails. he denies SI. no manic or psychotic symptoms. he reports improving GI related side effects he had with lexapro when initially started

## 2023-11-29 NOTE — BH INPATIENT PSYCHIATRY PROGRESS NOTE - NSBHCHARTREVIEWVS_PSY_A_CORE FT
Vital Signs Last 24 Hrs  T(C): 36.6 (11-24-23 @ 08:33), Max: 36.8 (11-23-23 @ 21:15)  T(F): 97.8 (11-24-23 @ 08:33), Max: 98.3 (11-23-23 @ 21:15)  HR: --  BP: --  BP(mean): --  RR: --  SpO2: --    Orthostatic VS  11-24-23 @ 08:33  Lying BP: --/-- HR: --  Sitting BP: 102/68 HR: 68  Standing BP: 120/77 HR: 66  Site: --  Mode: --  Orthostatic VS  11-23-23 @ 21:15  Lying BP: --/-- HR: --  Sitting BP: 119/76 HR: 72  Standing BP: 111/89 HR: 82  Site: --  Mode: --  Orthostatic VS  11-23-23 @ 07:54  Lying BP: --/-- HR: --  Sitting BP: 100/66 HR: 68  Standing BP: 105/70 HR: 76  Site: --  Mode: --  Orthostatic VS  11-22-23 @ 20:55  Lying BP: --/-- HR: --  Sitting BP: 117/75 HR: 74  Standing BP: 128/81 HR: 85  Site: --  Mode: --  
Vital Signs Last 24 Hrs  T(C): 36.9 (11-25-23 @ 08:12), Max: 36.9 (11-25-23 @ 08:12)  T(F): 98.4 (11-25-23 @ 08:12), Max: 98.4 (11-25-23 @ 08:12)  HR: --  BP: --  BP(mean): --  RR: --  SpO2: --    Orthostatic VS  11-25-23 @ 08:12  Lying BP: --/-- HR: --  Sitting BP: 111/74 HR: 64  Standing BP: 119/85 HR: 89  Site: --  Mode: --  Orthostatic VS  11-24-23 @ 08:33  Lying BP: --/-- HR: --  Sitting BP: 102/68 HR: 68  Standing BP: 120/77 HR: 66  Site: --  Mode: --  Orthostatic VS  11-23-23 @ 21:15  Lying BP: --/-- HR: --  Sitting BP: 119/76 HR: 72  Standing BP: 111/89 HR: 82  Site: --  Mode: --  
Vital Signs Last 24 Hrs  T(C): 36.6 (11-28-23 @ 08:11), Max: 36.6 (11-28-23 @ 08:11)  T(F): 97.8 (11-28-23 @ 08:11), Max: 97.8 (11-28-23 @ 08:11)  HR: --  BP: --  BP(mean): --  RR: 18 (11-28-23 @ 08:11) (18 - 18)  SpO2: --    Orthostatic VS  11-28-23 @ 08:11  Lying BP: --/-- HR: --  Sitting BP: 122/78 HR: 60  Standing BP: 122/74 HR: 62  Site: upper right arm  Mode: electronic  Orthostatic VS  11-27-23 @ 08:34  Lying BP: --/-- HR: --  Sitting BP: 127/85 HR: 59  Standing BP: 121/88 HR: 67  Site: --  Mode: --  
Vital Signs Last 24 Hrs  T(C): 36.8 (11-27-23 @ 08:34), Max: 36.8 (11-27-23 @ 08:34)  T(F): 98.3 (11-27-23 @ 08:34), Max: 98.3 (11-27-23 @ 08:34)  HR: --  BP: --  BP(mean): --  RR: --  SpO2: --    Orthostatic VS  11-27-23 @ 08:34  Lying BP: --/-- HR: --  Sitting BP: 127/85 HR: 59  Standing BP: 121/88 HR: 67  Site: --  Mode: --  Orthostatic VS  11-26-23 @ 09:00  Lying BP: --/-- HR: --  Sitting BP: 112/81 HR: 60  Standing BP: 114/79 HR: 60  Site: --  Mode: --  
Vital Signs Last 24 Hrs  T(C): 36.7 (11-23-23 @ 07:54), Max: 36.7 (11-22-23 @ 20:55)  T(F): 98 (11-23-23 @ 07:54), Max: 98 (11-22-23 @ 20:55)  HR: --  BP: --  BP(mean): --  RR: --  SpO2: --    Orthostatic VS  11-23-23 @ 07:54  Lying BP: --/-- HR: --  Sitting BP: 100/66 HR: 68  Standing BP: 105/70 HR: 76  Site: --  Mode: --  Orthostatic VS  11-22-23 @ 20:55  Lying BP: --/-- HR: --  Sitting BP: 117/75 HR: 74  Standing BP: 128/81 HR: 85  Site: --  Mode: --  Orthostatic VS  11-22-23 @ 08:27  Lying BP: --/-- HR: --  Sitting BP: 105/60 HR: 60  Standing BP: 116/95 HR: 60  Site: --  Mode: --  
Vital Signs Last 24 Hrs  T(C): --  T(F): --  HR: --  BP: --  BP(mean): --  RR: --  SpO2: --    Orthostatic VS  11-28-23 @ 08:11  Lying BP: --/-- HR: --  Sitting BP: 122/78 HR: 60  Standing BP: 122/74 HR: 62  Site: upper right arm  Mode: electronic  
Vital Signs Last 24 Hrs  T(C): 36.3 (11-22-23 @ 08:27), Max: 36.3 (11-22-23 @ 08:27)  T(F): 97.3 (11-22-23 @ 08:27), Max: 97.3 (11-22-23 @ 08:27)  HR: --  BP: --  BP(mean): --  RR: --  SpO2: --    Orthostatic VS  11-22-23 @ 08:27  Lying BP: --/-- HR: --  Sitting BP: 105/60 HR: 60  Standing BP: 116/95 HR: 60  Site: --  Mode: --  Orthostatic VS  11-20-23 @ 21:44  Lying BP: --/-- HR: --  Sitting BP: 127/82 HR: 77  Standing BP: 121/78 HR: 84  Site: --  Mode: --  Orthostatic VS  11-20-23 @ 17:00  Lying BP: --/-- HR: --  Sitting BP: 136/80 HR: 84  Standing BP: 124/83 HR: 89  Site: --  Mode: --

## 2023-11-29 NOTE — BH INPATIENT PSYCHIATRY DISCHARGE NOTE - NSBHDCHANDOFFFT_PSY_ALL_CORE
Clinical handoff including hospital course, diagnosis, and treatment was sent to: [facility] [phone].

## 2023-11-29 NOTE — BH INPATIENT PSYCHIATRY DISCHARGE NOTE - NSBHFUPINTERVALHXFT_PSY_A_CORE
No overnight events.  Pt today states that he needs to return to his business and family.  Pt continues to express remorse over his attempt prior to admission and relief over being rescued and receiving treatment.  Pt acknowledges that he spent too much time allowing stress and negativity to build up in his life, and that he will be more cognizant of his stress levels moving forward and especially immediately post discharge.

## 2023-11-29 NOTE — BH INPATIENT PSYCHIATRY PROGRESS NOTE - MSE UNSTRUCTURED FT
Appearance: Dressed appropriately. fair grooming   Attitude / Behavior / relatedness: cooperative and calm   Eye contact: appropriate   Motor: No abnormal movements, no psychomotor slowing or activation.  Speech: Regular rate. soft volume   Mood: "great"  Affect: less constricted. more reactive   Thought Process: linear.   Thought Content: denies current SI and HI.   Perceptual: denies AVH   Insight: improving   Judgment: improving   Impulse control: appropriate    Cognition: grossly intact  Gait: Intact. 
Appearance: Dressed appropriately. fair grooming   Attitude / Behavior / relatedness: superficially cooperative.   Eye contact: appropriate   Motor: No abnormal movements, no psychomotor slowing or activation.  Speech: Regular rate. soft volume   Mood: "okay"  Affect: constricted. more reactive   Thought Process: linear.   Thought Content: denies current SI and HI.   Perceptual: denies AVH   Insight: improving   Judgment: improving   Impulse control: appropriate    Cognition: grossly intact  Gait: Intact. 
Appearance: Dressed appropriately. fair grooming   Attitude / Behavior / relatedness: cooperative and calm   Eye contact: appropriate   Motor: No abnormal movements, no psychomotor slowing or activation.  Speech: Regular rate. soft volume   Mood: "not that great"  Affect: less constricted. more reactive   Thought Process: linear.   Thought Content: denies current SI and HI.   Perceptual: denies AVH   Insight: improving   Judgment: improving   Impulse control: appropriate    Cognition: grossly intact  Gait: Intact. 
Appearance: Dressed appropriately. fair grooming   Attitude / Behavior / relatedness: cooperative and calm   Eye contact: appropriate   Motor: No abnormal movements, no psychomotor slowing or activation.  Speech: Regular rate. soft volume   Mood: "okay, at peace"  Affect: less constricted. more reactive   Thought Process: linear.   Thought Content: denies current SI and HI.   Perceptual: denies AVH   Insight: improving   Judgment: improving   Impulse control: appropriate    Cognition: grossly intact  Gait: Intact. 
Appearance: Dressed appropriately. fair grooming   Attitude / Behavior / relatedness: superficially cooperative.   Eye contact: fair but at times, intense  Motor: No abnormal movements, no psychomotor slowing or activation.  Speech: Regular rate. soft volume   Mood: "okay"  Affect: constricted appearing and with limited range.   Thought Process: linear.   Thought Content: denies current SI and HI. possible minimization   Perceptual: denies AVH   Insight: limited   Judgment: poor   Impulse control: fair     Cognition: grossly intact  Gait: Intact. 
Appearance: Dressed appropriately. fair grooming   Attitude / Behavior / relatedness: superficially cooperative.   Eye contact: appropriate   Motor: No abnormal movements, no psychomotor slowing or activation.  Speech: Regular rate. soft volume   Mood: "okay"  Affect: constricted. more reactive   Thought Process: linear.   Thought Content: denies current SI and HI.   Perceptual: denies AVH   Insight: improving   Judgment: improving   Impulse control: appropriate    Cognition: grossly intact  Gait: Intact. 
Appearance: Dressed appropriately. fair grooming   Attitude / Behavior / relatedness: superficially cooperative.   Eye contact: fair   Motor: No abnormal movements, no psychomotor slowing or activation.  Speech: Regular rate. soft volume   Mood: "okay"  Affect: constricted appearing and with limited range.   Thought Process: linear.   Thought Content: denies current SI and HI. possible minimization   Perceptual: denies AVH   Insight: limited   Judgment: poor   Impulse control: fair     Cognition: grossly intact  Gait: Intact.

## 2023-11-29 NOTE — BH INPATIENT PSYCHIATRY PROGRESS NOTE - NSBHASSESSSUMMFT_PSY_ALL_CORE
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 Patient was brought to the ED via ambulance and police last night after being found by his wife with a self-inflicted wound to his arm and after having consumed about 20 1 mg Sedoxil tablets (mexazolam).     Patient is very discharge focused and frustrated to leave soon. He has been adherent to medication and has no side effects. He denies any SI or HI. No AVH or psychosis/ manic sx. He is in good behavioral control. Hospitalization due to need for aftercare and planning is in progress. c/w plan as below    1. Safety: q15 obs    2. Psychiatric:  - continue Lexapro to 10mg qhs  -PRNs: Haldol 5mg + Lorazepam 2mg +/- Benadryl PO or IM for non-redirectable agitation   -Individual, group, milieu therapy  -Psychoeducation provided to patient regarding indication for medications, alternatives, side effects, adherence.    3. Medical:  -Standing: Hydrocortisone 1% cream for RLL rash  -PRN: Tylenol 650mg q6-8hr for moderate pain  -Routine labs    4. Disposition: pt declines PHP. Aftercare scheduling in process.    5. Legal status: 9.27

## 2023-11-29 NOTE — BH PSYCHOLOGY - GROUP THERAPY NOTE - NSPSYCHOLGRPBILLING_PSY_A_CORE
[id number] - Group Psychotherapy
[id number] - Group Psychotherapy
[id number] - Group Psychotherapy
[id number] - Group Psychotherapy

## 2023-11-29 NOTE — BH INPATIENT PSYCHIATRY DISCHARGE NOTE - NSBHASSESSSUMMFT_PSY_ALL_CORE
Attenuated presentation, pt is now euthymic and future oriented, without any SIIP or HIIP.    Risk assessment on discharge: Pt has chronic risk factors of martial discord, financial stressors, with acute risk factors of recent depressive symptoms including impulsive overdose/cutting, now treated with inpatient care consisting of medication management and psychotherapeutic interventions. Protective factors of stable housing, has child, future orientation, employment.  Pt has consistently denied suicidality and homicidality, is emotionally regulated with good behavioral control, and is caring for ADLs independently.  Pt is CHRONIC risk of harm, but is NO longer an acute or imminent risk of harm to self or others, pt can be discharged home with outpatient follow up.     1. Will d/c home on current regimen.  2. Psychoeducation provided regarding importance of compliance with outpatient appointments and medications.  3. Pt was advised to remain abstinent with substances.  4. Pt was advised to dial 911 or return to ER if they become danger to self or others.

## 2023-11-29 NOTE — BH INPATIENT PSYCHIATRY DISCHARGE NOTE - HOSPITAL COURSE
Pt was admitted with depressive symptoms in context of marital discord and financial stress, with impulsive overdose on benzodiazepine and self injurious behavior of cutting, following conversation with wife regarding divorce.  Etiology of presentation was most consistent with MDD with possible contributions from adjustment reaction and cluster B traits.  Throughout admission, pt consistently expressed remorse over his overdose/self injury, relief that he was rescued, and understanding of need for psychotherapy.  Pt was started on escitalopram, titrated to 10 mg daily.  Pt was also seen for individual sessions with psychology, and he regularly attended groups.  On this treatment regimen, pt became more euthymic and future oriented.  He was linear in thought process, goal directed in behavior, with improved emotional regulation and good behavioral control.  He was visible on unit, social with peers, was compliant with medications and basic care, and maintained his ADLs independently.  He consistently denied suicidality and homicidality.  Pt was amenable to outpatient mental health follow up, treatment team arranged for [facility].  Given that pt was no longer an acute or imminent risk of harm to self or others, he was discharged home. Pt was admitted with depressive symptoms in context of marital discord and financial stress, with impulsive overdose on benzodiazepine and self injurious behavior of cutting, following conversation with wife regarding divorce.  Etiology of presentation was most consistent with MDD, though differential also included possible contributions from adjustment reaction and cluster B traits.  Throughout admission, pt consistently expressed remorse over his overdose/self injury, relief that he was rescued, and understanding of need for psychotherapy.  Pt was started on escitalopram, titrated to 10 mg daily.  Pt was also seen for individual sessions with psychology, and he regularly attended groups.  On this treatment regimen, pt became more euthymic and future oriented.  He was linear in thought process, goal directed in behavior, with improved emotional regulation and good behavioral control.  He was visible on unit, social with peers, was compliant with medications and basic care, and maintained his ADLs independently.  He consistently denied suicidality and homicidality.  Pt was amenable to outpatient mental health follow up, treatment team arranged for [facility].  Given that pt was no longer an acute or imminent risk of harm to self or others, he was discharged home.

## 2023-11-29 NOTE — BH PSYCHOLOGY - GROUP THERAPY NOTE - NSPSYCHOLGRPCOGINT_PSY_A_CORE FT
Cognitive/behavioral therapy, Acceptance and Commitment therapy, Emotion regulation/coping skills taught, Psychoed 

## 2023-11-29 NOTE — BH PSYCHOLOGY - GROUP THERAPY NOTE - NSBHPSYCHOLPARTICIPCOMMENT_PSY_A_CORE FT
Pr participated independently 
Pt participated independently 

## 2023-11-29 NOTE — BH INPATIENT PSYCHIATRY DISCHARGE NOTE - DESCRIPTION
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

## 2023-11-29 NOTE — BH INPATIENT PSYCHIATRY DISCHARGE NOTE - HPI (INCLUDE ILLNESS QUALITY, SEVERITY, DURATION, TIMING, CONTEXT, MODIFYING FACTORS, ASSOCIATED SIGNS AND SYMPTOMS)
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 Patient was brought to the ED via ambulance and police last night after being found by his wife with a self-inflicted wound to his arm and after having consumed about 20 1 mg Sedoxil tablets (mexazolam).     Patient is guarded, acknowledges taking several pills and making superficial cuts. he begins interview by saying each day he is in the hospital, he is losing money and if he can't pay rent, the hospital will be guilty of this. He feels he is ready to go home. he minimizes his behavior though he acknowledges several stressors including potentially a divorce and also financially being unable to make ends meet. he is adamant he is not depressed, reports appropriate sleep, appetite. he does endorse feeling guilty and having low energy. denies SI. denies racing thoughts. denies decreased need for sleep. denies psychotic symptoms. denies substance use. he is hesitant but ultimately agrees to trial Lexapro. psychoeducation provided to patient.     Per ED note: Mexazolam is a long-acting benzodiazepine available in [facility] under the brand name Sedoxil. This medication was not prescribed to the patient, but was given to his mother in law who was visiting from [facility] and who left the pills at his home.  Patient provides little history and though awake and alert at the time of exam is rather guarded, offering only one or two word answers to questions, asking to go home. Wife gives the majority of history, reports that she has been discussing divorce with him for some time, and has been  from him in their own home- he sleeps on the couch in the living room. Yesterday evening she told him that she had actually found an  and paid and that the divorce will be happening. He was terribly upset by this news, apparently did not think that divorce would happen. She went into her room and closed the door. When she came out of her room last night to go to the kitchen, she found him on the floor "with blood everywhere, and the empty pill blisters next to him." Her daughter was in her room, and never saw this. She initially called a friend, who came over and then called an ambulance/911. Her  was awake when 911 arrived, was refusing to go to the ED, required police to bring him. Wife reports that before he left for the hospital he told her "I will never forgive you for this," and she found this chilling. She reports that he has never been depressed, but he does have a temper and she was worried about what this statement meant, also worried about him returning home to live with them.   On exam, patient is alert, reports only "I took the pills, I don't think they would kill me." Denies feeling sad or depressed, denies all psychiatric review of systems, does not elaborate at all on circumstances leading up to ingestion, asks only to "go home." He was medically cleared for toxicology, transferred to . As per ER Behavioral Health Assessment Note filed on 11/19/2023 at [facility] ER: "38 year-old Green[place] male , lives in [place] with wife and 4 year-old daughter, no previous psychiatric history, no previous suicide attempts, no substance misuse, no legal issues. Patient was brought to the ED via ambulance and police last night after being found by his wife with a self-inflicted wound to his arm and after having consumed about 20 1 mg Sedoxil tablets (mexazolam). Mexazolam is a long-acting benzodiazepine available in [facility] under the brand name Sedoxil. This medication was not prescribed to the patient, but was given to his mother in law who was visiting from [facility] and who left the pills at his home.  Patient provides little history and though awake and alert at the time of exam is rather guarded, offering only one or two word answers to questions, asking to go home. Wife gives the majority of history, reports that she has been discussing divorce with him for some time, and has been  from him in their own home- he sleeps on the couch in the living room. Yesterday evening she told him that she had actually found an  and paid and that the divorce will be happening. He was terribly upset by this news, apparently did not think that divorce would happen. She went into her room and closed the door. When she came out of her room last night to go to the kitchen, she found him on the floor "with blood everywhere, and the empty pill blisters next to him." Her daughter was in her room, and never saw this. She initially called a friend, who came over and then called an ambulance/911. Her  was awake when 911 arrived, was refusing to go to the ED, required police to bring him. Wife reports that before he left for the hospital he told her "I will never forgive you for this," and she found this chilling. She reports that he has never been depressed, but he does have a temper and she was worried about what this statement meant, also worried about him returning home to live with them.  On exam, patient is alert, reports only "I took the pills, I don't think they would kill me." Denies feeling sad or depressed, denies all psychiatric review of systems, does not elaborate at all on circumstances leading up to ingestion, asks only to "go home." He was medically cleared for toxicology, transferred to ."

## 2023-11-29 NOTE — BH INPATIENT PSYCHIATRY DISCHARGE NOTE - NSDCCPCAREPLAN_GEN_ALL_CORE_FT
PRINCIPAL DISCHARGE DIAGNOSIS  Diagnosis: Major depressive disorder, single episode  Assessment and Plan of Treatment: Medication management and psychotherapy

## 2023-11-29 NOTE — BH PSYCHOLOGY - CLINICIAN PSYCHOTHERAPY NOTE - TOKEN PULL-DIAGNOSIS
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
## 2023-11-29 NOTE — BH PSYCHOLOGY - GROUP THERAPY NOTE - NSPSYCHOLGRPCOGINT_PSY_A_CORE
group members provided support/group members suggested positive behaviors/mindfulness skills taught/relaxation skills practiced/other..

## 2023-11-29 NOTE — BH PSYCHOLOGY - CLINICIAN PSYCHOTHERAPY NOTE - NSBHPSYCHOLGOALS_PSY_A_CORE
Decrease symptoms/Improve social/vocational/coping skills/Psychoeducation
Decrease symptoms/Assessment/Improve social/vocational/coping skills/Psychoeducation
Decrease symptoms/Improve social/vocational/coping skills/Psychoeducation/Treatment compliance

## 2023-11-29 NOTE — BH INPATIENT PSYCHIATRY PROGRESS NOTE - NSICDXBHPRIMARYDX_PSY_ALL_CORE
Major depressive disorder, single episode   F32.9  

## 2023-11-29 NOTE — BH PSYCHOLOGY - GROUP THERAPY NOTE - NSPSYCHOLGRPCOGPROB_PSY_A_CORE FT
Anxiety, Depression, Emotion dysregulation, Lack of coping skills 

## 2023-11-29 NOTE — BH INPATIENT PSYCHIATRY PROGRESS NOTE - PRN MEDS
MEDICATIONS  (PRN):  acetaminophen     Tablet .. 650 milliGRAM(s) Oral every 6 hours PRN Mild Pain (1 - 3), Moderate Pain (4 - 6)  LORazepam     Tablet 2 milliGRAM(s) Oral every 6 hours PRN Agitation  LORazepam   Injectable 2 milliGRAM(s) IntraMuscular Once PRN severe agitation  melatonin. 3 milliGRAM(s) Oral at bedtime PRN Insomnia  

## 2023-11-29 NOTE — BH INPATIENT PSYCHIATRY PROGRESS NOTE - NSTXDEPRESINTERMD_PSY_ALL_CORE
medications + therapy

## 2023-11-29 NOTE — BH INPATIENT PSYCHIATRY PROGRESS NOTE - NSBHMETABOLIC_PSY_ALL_CORE_FT
BMI:   HbA1c:   Glucose: POCT Blood Glucose.: 83 mg/dL (11-19-23 @ 02:41)    BP: --Vital Signs Last 24 Hrs  T(C): 36.6 (11-24-23 @ 08:33), Max: 36.8 (11-23-23 @ 21:15)  T(F): 97.8 (11-24-23 @ 08:33), Max: 98.3 (11-23-23 @ 21:15)  HR: --  BP: --  BP(mean): --  RR: --  SpO2: --    Orthostatic VS  11-24-23 @ 08:33  Lying BP: --/-- HR: --  Sitting BP: 102/68 HR: 68  Standing BP: 120/77 HR: 66  Site: --  Mode: --  Orthostatic VS  11-23-23 @ 21:15  Lying BP: --/-- HR: --  Sitting BP: 119/76 HR: 72  Standing BP: 111/89 HR: 82  Site: --  Mode: --  Orthostatic VS  11-23-23 @ 07:54  Lying BP: --/-- HR: --  Sitting BP: 100/66 HR: 68  Standing BP: 105/70 HR: 76  Site: --  Mode: --  Orthostatic VS  11-22-23 @ 20:55  Lying BP: --/-- HR: --  Sitting BP: 117/75 HR: 74  Standing BP: 128/81 HR: 85  Site: --  Mode: --    Lipid Panel: 
BMI:   HbA1c:   Glucose: POCT Blood Glucose.: 83 mg/dL (11-19-23 @ 02:41)    BP: --Vital Signs Last 24 Hrs  T(C): 36.6 (11-28-23 @ 08:11), Max: 36.6 (11-28-23 @ 08:11)  T(F): 97.8 (11-28-23 @ 08:11), Max: 97.8 (11-28-23 @ 08:11)  HR: --  BP: --  BP(mean): --  RR: 18 (11-28-23 @ 08:11) (18 - 18)  SpO2: --    Orthostatic VS  11-28-23 @ 08:11  Lying BP: --/-- HR: --  Sitting BP: 122/78 HR: 60  Standing BP: 122/74 HR: 62  Site: upper right arm  Mode: electronic  Orthostatic VS  11-27-23 @ 08:34  Lying BP: --/-- HR: --  Sitting BP: 127/85 HR: 59  Standing BP: 121/88 HR: 67  Site: --  Mode: --    Lipid Panel: 
BMI:   HbA1c:   Glucose: POCT Blood Glucose.: 83 mg/dL (11-19-23 @ 02:41)    BP: --Vital Signs Last 24 Hrs  T(C): 36.9 (11-25-23 @ 08:12), Max: 36.9 (11-25-23 @ 08:12)  T(F): 98.4 (11-25-23 @ 08:12), Max: 98.4 (11-25-23 @ 08:12)  HR: --  BP: --  BP(mean): --  RR: --  SpO2: --    Orthostatic VS  11-25-23 @ 08:12  Lying BP: --/-- HR: --  Sitting BP: 111/74 HR: 64  Standing BP: 119/85 HR: 89  Site: --  Mode: --  Orthostatic VS  11-24-23 @ 08:33  Lying BP: --/-- HR: --  Sitting BP: 102/68 HR: 68  Standing BP: 120/77 HR: 66  Site: --  Mode: --  Orthostatic VS  11-23-23 @ 21:15  Lying BP: --/-- HR: --  Sitting BP: 119/76 HR: 72  Standing BP: 111/89 HR: 82  Site: --  Mode: --    Lipid Panel: 
BMI:   HbA1c:   Glucose: POCT Blood Glucose.: 83 mg/dL (11-19-23 @ 02:41)    BP: 115/71 (11-20-23 @ 12:31) (105/58 - 115/71)Vital Signs Last 24 Hrs  T(C): 36.3 (11-22-23 @ 08:27), Max: 36.3 (11-22-23 @ 08:27)  T(F): 97.3 (11-22-23 @ 08:27), Max: 97.3 (11-22-23 @ 08:27)  HR: --  BP: --  BP(mean): --  RR: --  SpO2: --    Orthostatic VS  11-22-23 @ 08:27  Lying BP: --/-- HR: --  Sitting BP: 105/60 HR: 60  Standing BP: 116/95 HR: 60  Site: --  Mode: --  Orthostatic VS  11-20-23 @ 21:44  Lying BP: --/-- HR: --  Sitting BP: 127/82 HR: 77  Standing BP: 121/78 HR: 84  Site: --  Mode: --  Orthostatic VS  11-20-23 @ 17:00  Lying BP: --/-- HR: --  Sitting BP: 136/80 HR: 84  Standing BP: 124/83 HR: 89  Site: --  Mode: --    Lipid Panel: 
BMI:   HbA1c:   Glucose: POCT Blood Glucose.: 83 mg/dL (11-19-23 @ 02:41)    BP: --Vital Signs Last 24 Hrs  T(C): 36.8 (11-27-23 @ 08:34), Max: 36.8 (11-27-23 @ 08:34)  T(F): 98.3 (11-27-23 @ 08:34), Max: 98.3 (11-27-23 @ 08:34)  HR: --  BP: --  BP(mean): --  RR: --  SpO2: --    Orthostatic VS  11-27-23 @ 08:34  Lying BP: --/-- HR: --  Sitting BP: 127/85 HR: 59  Standing BP: 121/88 HR: 67  Site: --  Mode: --  Orthostatic VS  11-26-23 @ 09:00  Lying BP: --/-- HR: --  Sitting BP: 112/81 HR: 60  Standing BP: 114/79 HR: 60  Site: --  Mode: --    Lipid Panel: 
BMI:   HbA1c:   Glucose: POCT Blood Glucose.: 83 mg/dL (11-19-23 @ 02:41)    BP: --Vital Signs Last 24 Hrs  T(C): 36.7 (11-23-23 @ 07:54), Max: 36.7 (11-22-23 @ 20:55)  T(F): 98 (11-23-23 @ 07:54), Max: 98 (11-22-23 @ 20:55)  HR: --  BP: --  BP(mean): --  RR: --  SpO2: --    Orthostatic VS  11-23-23 @ 07:54  Lying BP: --/-- HR: --  Sitting BP: 100/66 HR: 68  Standing BP: 105/70 HR: 76  Site: --  Mode: --  Orthostatic VS  11-22-23 @ 20:55  Lying BP: --/-- HR: --  Sitting BP: 117/75 HR: 74  Standing BP: 128/81 HR: 85  Site: --  Mode: --  Orthostatic VS  11-22-23 @ 08:27  Lying BP: --/-- HR: --  Sitting BP: 105/60 HR: 60  Standing BP: 116/95 HR: 60  Site: --  Mode: --    Lipid Panel: 
BMI:   HbA1c:   Glucose: POCT Blood Glucose.: 83 mg/dL (11-19-23 @ 02:41)    BP: --Vital Signs Last 24 Hrs  T(C): --  T(F): --  HR: --  BP: --  BP(mean): --  RR: --  SpO2: --    Orthostatic VS  11-28-23 @ 08:11  Lying BP: --/-- HR: --  Sitting BP: 122/78 HR: 60  Standing BP: 122/74 HR: 62  Site: upper right arm  Mode: electronic    Lipid Panel:

## 2023-11-29 NOTE — BH PSYCHOLOGY - GROUP THERAPY NOTE - NSPSYCHOLGRPCOGGOAL_PSY_A_CORE FT
Decrease symptoms, Develop coping/emotion regulation skills, Psychoeducation    

## 2023-12-02 ENCOUNTER — TRANSCRIPTION ENCOUNTER (OUTPATIENT)
Age: 38
End: 2023-12-02

## 2023-12-05 ENCOUNTER — OUTPATIENT (OUTPATIENT)
Dept: OUTPATIENT SERVICES | Facility: HOSPITAL | Age: 38
LOS: 1 days | Discharge: STILL ADMITTED OR ADMINISTRATIVE | End: 2023-12-05
Payer: MEDICAID

## 2023-12-05 PROCEDURE — 90839 PSYTX CRISIS INITIAL 60 MIN: CPT

## 2023-12-06 DIAGNOSIS — F32.2 MAJOR DEPRESSIVE DISORDER, SINGLE EPISODE, SEVERE WITHOUT PSYCHOTIC FEATURES: ICD-10-CM

## 2023-12-06 RX ORDER — ESCITALOPRAM OXALATE 10 MG/1
1 TABLET, FILM COATED ORAL
Qty: 30 | Refills: 0
Start: 2023-12-06 | End: 2024-01-04

## 2023-12-06 NOTE — POST DISCHARGE NOTE - ADDITIONAL DOCUMENTATION:
Patient contacted team requesting refill of Lexapro. Patient made it to crisis appt (bridge) but his next [facility] appointment is 1/5/24. Pt was given 14 day supply of Lexapro on discharge. Will provide #30 Lexapro 10mg to bridge til [facility] appt. Medications sent to [facility] [address] [place], [address] Patient contacted team requesting refill of Lexapro. Patient made it to crisis appt (bridge) but his next [facility] appointment is 1/5/24. Pt was given 14 day supply of Lexapro on discharge. Will provide #30 Lexapro 10mg to bridge til [facility] appt. Medications sent to [facility] [phone] [place], [address]

## 2023-12-18 PROCEDURE — 90834 PSYTX W PT 45 MINUTES: CPT

## 2024-01-04 ENCOUNTER — OUTPATIENT (OUTPATIENT)
Dept: OUTPATIENT SERVICES | Facility: HOSPITAL | Age: 39
LOS: 1 days | Discharge: HOME | End: 2024-01-04
Payer: MEDICAID

## 2024-01-04 DIAGNOSIS — K64.4 RESIDUAL HEMORRHOIDAL SKIN TAGS: Chronic | ICD-10-CM

## 2024-01-04 PROCEDURE — 90791 PSYCH DIAGNOSTIC EVALUATION: CPT

## 2024-01-08 DIAGNOSIS — F32.5 MAJOR DEPRESSIVE DISORDER, SINGLE EPISODE, IN FULL REMISSION: ICD-10-CM

## 2024-04-25 ENCOUNTER — EMERGENCY (EMERGENCY)
Facility: HOSPITAL | Age: 39
LOS: 1 days | Discharge: HOME | End: 2024-04-25
Attending: EMERGENCY MEDICINE | Admitting: EMERGENCY MEDICINE
Payer: MEDICAID

## 2024-04-25 VITALS
HEART RATE: 74 BPM | TEMPERATURE: 98 F | DIASTOLIC BLOOD PRESSURE: 70 MMHG | SYSTOLIC BLOOD PRESSURE: 126 MMHG | RESPIRATION RATE: 18 BRPM | OXYGEN SATURATION: 98 %

## 2024-04-25 DIAGNOSIS — K64.4 RESIDUAL HEMORRHOIDAL SKIN TAGS: Chronic | ICD-10-CM

## 2024-04-25 PROBLEM — Z78.9 OTHER SPECIFIED HEALTH STATUS: Chronic | Status: ACTIVE | Noted: 2020-11-16

## 2024-04-25 PROCEDURE — 99284 EMERGENCY DEPT VISIT MOD MDM: CPT | Mod: 25

## 2024-04-25 PROCEDURE — 12013 RPR F/E/E/N/L/M 2.6-5.0 CM: CPT

## 2024-04-25 RX ORDER — TETANUS TOXOID, REDUCED DIPHTHERIA TOXOID AND ACELLULAR PERTUSSIS VACCINE, ADSORBED 5; 2.5; 8; 8; 2.5 [IU]/.5ML; [IU]/.5ML; UG/.5ML; UG/.5ML; UG/.5ML
0.5 SUSPENSION INTRAMUSCULAR ONCE
Refills: 0 | Status: COMPLETED | OUTPATIENT
Start: 2024-04-25 | End: 2024-04-25

## 2024-04-25 RX ADMIN — TETANUS TOXOID, REDUCED DIPHTHERIA TOXOID AND ACELLULAR PERTUSSIS VACCINE, ADSORBED 0.5 MILLILITER(S): 5; 2.5; 8; 8; 2.5 SUSPENSION INTRAMUSCULAR at 13:45

## 2024-04-25 NOTE — ED PROVIDER NOTE - PHYSICAL EXAMINATION
Const: not in acute distress  Eyes: no conjunctival injection. EOMI. PERRL  HEENT: 4 cm laceration to R eyebrow not involving eyelid. Moist MM.  Neck: Trachea midline.   CVS: +S1/S2, Peripheral pulses 2+ and equal in all extremities.  RESP: Unlabored respiratory effort. Clear to auscultation bilaterally.  GI: Nontender/Nondistended, No CVA tenderness b/l.   MSK: Normocephalic/Atraumatic, No Lower Extremities edema b/l.   Skin: Intact.   Neuro:  Motor & Sensation grossly intact.  Psych: Awake, Alert, & Cooperative

## 2024-04-25 NOTE — ED PROVIDER NOTE - PATIENT PORTAL LINK FT
You can access the FollowMyHealth Patient Portal offered by [facility] by registering at the following website: [email or web address]. By joining [email or web address]’s FollowMyHealth portal, you will also be able to view your health information using other applications (apps) compatible with our system.

## 2024-04-25 NOTE — ED ADULT TRIAGE NOTE - CHIEF COMPLAINT QUOTE
Patient had 2x4 wood fall to right forehead and lac noted with bleeding. Denies PHx. Breathing non-labored. Denies LOC, no blood thinner.

## 2024-04-25 NOTE — ED PROVIDER NOTE - CLINICAL SUMMARY MEDICAL DECISION MAKING FREE TEXT BOX
38-year-old male with past medical history of suicide attempt, presenting to the ED with laceration.  Hemodynamically stable.  Physical exam with heart regular rate and rhythm, lungs clear to auscultation, abdomen soft nondistended nontender.  Patient with 3 cm laceration to eyebrow.  No indentations or fractures noted underneath.  EOMI, ARCHIE.  Will update patient's tetanus and repair patient's lac.  Will give patient laceration and give patient plastics follow-up.

## 2024-04-25 NOTE — ED PROVIDER NOTE - ATTENDING CONTRIBUTION TO CARE
Agree with resident note  38-year-old male with no significant past medical history presents with laceration.  Patient was doing work at home and a 2 x 4 fell and hit him above the right eyebrow.  Patient denies any visual complaints.  Denies loss of consciousness or vomiting subsequently.  Physical exam  Well-appearing male in no distress  Vital signs stable  4 cm linear laceration above right eyebrow, not including eyelid  Eyes pupils equal and reactive to light, visual acuity intact, extraocular movements intact  Impression  Laceration, not involving eyelid  Will update patient's tetanus repair laceration and discharged home patient understands to return in 3 to 5 days for suture removal

## 2024-04-25 NOTE — ED PROVIDER NOTE - PROGRESS NOTE DETAILS
MD Tatiana (PGY-2) laceration repaired.  Patient was given bacitracin and gauze.  Patient follow-up with hospice and come back in 3 to 5 days for suture removal.

## 2024-04-25 NOTE — ED PROVIDER NOTE - OBJECTIVE STATEMENT
38-year-old male with past medical history of suicide attempt, presenting to the ED with laceration.  Patient states that he was doing some work on his house, when a 2 x 4 white blanket fell on his right forehead.  Patient with laceration.  Denies any LOC, headache, nausea, vomiting.  Patient remembers the fall.  Denies any vision changes.  Denies any blood thinners.  Unsure of last tetanus shot.

## 2024-04-25 NOTE — ED PROVIDER NOTE - NSFOLLOWUPINSTRUCTIONS_ED_ALL_ED_FT
You have been evaluated in the Emergency Department today for a laceration to your eyebrow. Your laceration was repaired in the ED with sutures. Please keep the area surrounding the laceration clean and dry. Please keep the area out of the sunlight for the next 6 months to help prevent scarring. You should have the sutures removed in 3-5 days by your primary care physician, or at your local urgent care or ER. If you develop redness or swelling at the site of your laceration please come back to the ER for a wound check.    We recommend you take 600mg ibuprofen every 6 hours or tylenol 650mg every 6 hours as needed for pain. If needed, you can alternate these medications so that you take one medication every 3 hours. For instance, at noon take ibuprofen, then at 3pm take tylenol, then at 6pm take ibuprofen.    Please follow up with your primary care physician in 3-5 days for suture removal. You can also return to the ER or another urgent care facility for this service.    Return to the Emergency Department if you experience discharge from your laceration, redness around your laceration, warmth around your laceration, fever, vomiting, numbness, tingling, or any other concerning symptoms.

## 2024-04-25 NOTE — ED ADULT NURSE NOTE - OBJECTIVE STATEMENT
Pt received to intake presents with laceration above rt eyebrow, reports piece of wood fell and hit head, denies blurry vision. a&ox4, ambulatory at baseline, skin intact, respirations even and unlabored, awaiting MD berg, laceration mildly bleeding. will await orders and continue to monitor.

## 2024-04-28 ENCOUNTER — EMERGENCY (EMERGENCY)
Facility: HOSPITAL | Age: 39
LOS: 1 days | Discharge: HOME | End: 2024-04-28
Attending: STUDENT IN AN ORGANIZED HEALTH CARE EDUCATION/TRAINING PROGRAM | Admitting: STUDENT IN AN ORGANIZED HEALTH CARE EDUCATION/TRAINING PROGRAM
Payer: MEDICAID

## 2024-04-28 VITALS
SYSTOLIC BLOOD PRESSURE: 128 MMHG | RESPIRATION RATE: 20 BRPM | DIASTOLIC BLOOD PRESSURE: 74 MMHG | OXYGEN SATURATION: 98 % | TEMPERATURE: 98 F | HEART RATE: 73 BPM

## 2024-04-28 DIAGNOSIS — K64.4 RESIDUAL HEMORRHOIDAL SKIN TAGS: Chronic | ICD-10-CM

## 2024-04-28 PROCEDURE — L9995: CPT

## 2024-04-28 NOTE — ED PROVIDER NOTE - PATIENT PORTAL LINK FT
You can access the FollowMyHealth Patient Portal offered by [facility] by registering at the following website: [email or web address]. By joining [facility]’s FollowMyHealth portal, you will also be able to view your health information using other applications (apps) compatible with our system.

## 2024-04-28 NOTE — ED ADULT NURSE NOTE - OBJECTIVE STATEMENT
received to intake room 5 ambulatory with no Hx presents for suture removal. MD Hillman removed 5 sutures from Right eyebrow. pt tolerated well. pt offers no complaints & required no nursing interventions at this time. GRUPO

## 2024-04-28 NOTE — ED PROVIDER NOTE - NSFOLLOWUPINSTRUCTIONS_ED_ALL_ED_FT
Thank you for visiting our Emergency Department, it has been a pleasure taking part in your healthcare.  Please read and follow all of your discharge instructions. These instructions contain important information regarding your Emergency Department visit and future medical care.     Your discharge diagnosis is: encouter for suture removal  Please take all discharge medications as indicated below:  Please continue all medications as rx'd by your PMD.  Please follow up with your Primary Care Doctor (PMD) within [phone] hours.  Bring and show your PMD all documents and results you were given during your ED visit.  If you do not have a primary care doctor please call [phone] to establish primary care.  A copy of resulted labs, imaging, and findings have been provided to you.   You can also access all of your results through the [employer].  If you have questions about your results, please call the Emergency Department.  During your visit and at time of discharge, you had a detailed discussion with your provider regarding your diagnosis, care management and discharge planning.  Topics that were discussed included but were not limited to: return precautions, follow up visits with existing or new providers, new prescriptions and/or medication changes, wound and/or splint/cast care, incidental laboratory/radiology findings, or other care   aspects specific to your diagnosis and treatment. You have been given the opportunity to have your questions answered. At this time you have been deemed stable and fit for discharge.  Return precautions to the Emergency Department include but are not limited to: unrelenting nausea, vomiting, fever, chills, chest pain, shortness of breath, dizziness, chest or abdominal pain, worsening back pain, syncope, blood in urine or stool, headache that doesn't resolve, numbness or tingling, loss of sensation, loss of motor function, or any other concerning symptoms.    Please bring all ED Documents you were given during your stay to your PMD.   They contain important information for you and your PMD, including incidental lab/radiology findings that your PMD should be aware of.

## 2024-04-28 NOTE — ED PROVIDER NOTE - CLINICAL SUMMARY MEDICAL DECISION MAKING FREE TEXT BOX
Kady LERNER:   Exam vitals are stable nontoxic-appearing with physical exam as above DDx patient is here for suture removal, wound appears well-healing clean dry and intact with 5 sutures in place, will remove sutures, advised good wound care at home, strict turn precautions were discussed patient agreeable plan for suture removal and discharge.

## 2024-04-28 NOTE — ED PROVIDER NOTE - OBJECTIVE STATEMENT
Kady LERNER: 38-year-old male presents with a chief complaint of right eyebrow suture removal.  Patient reports was seen in the ED a few days ago had sutures placed.  Reports wound has been healing well no concerns presents today to have his sutures taken out.  Denies any bleeding discharge or wound opening.  No other complaints at this time

## 2024-04-28 NOTE — ED PROVIDER NOTE - PHYSICAL EXAMINATION
Kady LERNER:  VITALS: Initial triage and subsequent vitals have been reviewed by me.  GEN APPEARANCE: Alert, cooperative. Non-toxic appearing. Well appearing. NAD.  HEAD: Atraumatic, normocephalic   EYES: PERRLa, EOMI, vision grossly intact.   EARS: Gross hearing intact.   NECK: Supple  MSK/EXT: Spine appears normal, no spine point tenderness. No CVA ttp. Normal muscular development. Pelvis stable. No obvious joint or bony deformity, no peripheral edema.   NEURO: Alert, follows commands. Weight bearing normal. Speech normal. Sensation and motor normal x4 extremities.   SKIN: Normal color for race, warm, dry and intact. No evidence of rash. R eye brow w x5 sutures in place wound appears well healing c/d/i   PSYCH: Normal mood and affect.

## 2024-05-21 NOTE — ED ADULT NURSE NOTE - NS ED NURSE LEVEL OF CONSCIOUSNESS SPEECH
Patient had to stop isosorbide due to headaches.   
Mental status is at baseline.   Psychiatric:         Mood and Affect: Mood normal.                  An electronic signature was used to authenticate this note.    --Eric Szymanski MD   
Speaking Coherently

## 2024-11-25 NOTE — ED ADULT NURSE NOTE - SUICIDE SCREENING QUESTION 2
Left message for Labiba that the protein analysis came back ok likely reactive increase in the labs and ok to continue to monitor. Also may be related to her blood count being slightly low. No further labs at this time. Asked to call back with any questions.    No

## 2024-12-14 NOTE — ED ADULT NURSE NOTE - NS ED NOTE ABUSE RESPONSE YN
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  Pt is treated at [facility]; last apt Sept 2024.  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    Nutrition Related Findings:    Pt tolerating diet; meds reviewed Wound Type: Moisture Associate Skin Damage       Lab Results   Component Value Date/Time     12/14/2024 04:56 AM    K 3.7 12/14/2024 04:56 AM     12/14/2024 04:56 AM    CO2 23 12/14/2024 04:56 AM    BUN 6 12/14/2024 04:56 AM    CREATININE 0.74 12/14/2024 04:56 AM    GLUCOSE 110 12/14/2024 04:56 AM    CALCIUM 8.6 12/14/2024 04:56 AM    PHOS 3.5 12/14/2024 04:56 AM    MG 2.0 12/14/2024 04:56 AM          Estimated Daily Nutrient Needs:  Energy Requirements Based On: Kcal/kg  Weight Used for Energy Requirements: Adjusted  Energy (kcal/day): 1915     Protein (g/day): 96  Method Used for Fluid Requirements: Defer to provider  Fluid (ml/day):      Nutrition Related Findings:   Edema: None                    Last BM:  12/13/24    Wounds:   Wound Type: Moisture Associate Skin Damage      Current Nutrition Intake & Therapies:    Average Meal Intake: %  Average Supplements Intake: None Ordered  ADULT DIET; Regular; Low Fat/Low Chol/High Fiber/AVELINA  Meal Intake:   No data found.  Supplement Intake:  No data found.  Nutrition Support: none      Anthropometric Measures:  Height: 162.6 cm (5' 4\")  Ideal Body Weight (IBW): 120 lbs (55 kg)       Current Body Weight: 82.2 kg (181 lb 4.8 oz), 151.1 % IBW. Weight Source: Standing scale  Current BMI (kg/m2): 31.1        Weight Adjustment For:  (68.4 kg ABW)                 BMI Categories: Obese Class 1 (BMI 30.0-34.9)    Wt Readings from Last 10 Encounters:   12/13/24  Unable to assess due to medical condition